# Patient Record
Sex: MALE | Race: BLACK OR AFRICAN AMERICAN | NOT HISPANIC OR LATINO | Employment: UNEMPLOYED | URBAN - METROPOLITAN AREA
[De-identification: names, ages, dates, MRNs, and addresses within clinical notes are randomized per-mention and may not be internally consistent; named-entity substitution may affect disease eponyms.]

---

## 2022-09-25 ENCOUNTER — HOSPITAL ENCOUNTER (EMERGENCY)
Facility: HOSPITAL | Age: 42
Discharge: HOME | End: 2022-09-25
Attending: FAMILY MEDICINE
Payer: MEDICAID

## 2022-09-25 VITALS
OXYGEN SATURATION: 97 % | BODY MASS INDEX: 36.3 KG/M2 | WEIGHT: 268 LBS | RESPIRATION RATE: 18 BRPM | TEMPERATURE: 97 F | SYSTOLIC BLOOD PRESSURE: 145 MMHG | HEIGHT: 72 IN | HEART RATE: 91 BPM | DIASTOLIC BLOOD PRESSURE: 85 MMHG

## 2022-09-25 DIAGNOSIS — N50.812 PAIN IN LEFT TESTICLE: Primary | ICD-10-CM

## 2022-09-25 DIAGNOSIS — N43.3 HYDROCELE, LEFT: ICD-10-CM

## 2022-09-25 LAB
AMPHET UR QL SCN: NEGATIVE
APPEARANCE UR: CLEAR
BARBITURATE SCN PRESENT UR: NEGATIVE
BENZODIAZ UR QL SCN: NEGATIVE
BILIRUB UR QL STRIP.AUTO: NEGATIVE MG/DL
CANNABINOIDS UR QL SCN: POSITIVE
COCAINE UR QL SCN: NEGATIVE
COLOR UR AUTO: ABNORMAL
GLUCOSE UR QL STRIP.AUTO: NEGATIVE MG/DL
KETONES UR QL STRIP.AUTO: NEGATIVE MG/DL
LEUKOCYTE ESTERASE UR QL STRIP.AUTO: NEGATIVE UNIT/L
MDMA UR QL SCN: NEGATIVE
NITRITE UR QL STRIP.AUTO: NEGATIVE
OPIATES UR QL SCN: NEGATIVE
PCP UR QL: NEGATIVE
PH UR STRIP.AUTO: 6 [PH]
PH UR: 6 [PH] (ref 3–11)
PROT UR QL STRIP.AUTO: NEGATIVE MG/DL
RBC UR QL AUTO: NEGATIVE UNIT/L
SP GR UR STRIP.AUTO: <=1.005
SPECIFIC GRAVITY, URINE AUTO (.000) (OHS): <=1.005 (ref 1–1.03)
UROBILINOGEN UR STRIP-ACNC: 2 MG/DL

## 2022-09-25 PROCEDURE — 99284 EMERGENCY DEPT VISIT MOD MDM: CPT | Mod: 25

## 2022-09-25 PROCEDURE — 81003 URINALYSIS AUTO W/O SCOPE: CPT | Mod: 59 | Performed by: FAMILY MEDICINE

## 2022-09-25 PROCEDURE — 80307 DRUG TEST PRSMV CHEM ANLYZR: CPT | Performed by: FAMILY MEDICINE

## 2022-09-25 RX ORDER — TRAMADOL HYDROCHLORIDE 50 MG/1
50 TABLET ORAL EVERY 6 HOURS PRN
Qty: 8 TABLET | Refills: 0 | Status: SHIPPED | OUTPATIENT
Start: 2022-09-25 | End: 2022-09-27

## 2022-09-25 RX ORDER — IBUPROFEN 800 MG/1
800 TABLET ORAL EVERY 8 HOURS PRN
Qty: 15 TABLET | Refills: 0 | Status: SHIPPED | OUTPATIENT
Start: 2022-09-25 | End: 2022-09-30

## 2022-09-26 NOTE — ED PROVIDER NOTES
Encounter Date: 9/25/2022       History     Chief Complaint   Patient presents with    Groin Pain     41-year-old male presents with left testicle pain for the past 2 days.  Patient states pain started after having sex with his wife.  The patient believes his testicle got slight twisted.  Yesterday he was able to untwist the testicle with significant improvement.  Patient denies dysuria or hematuria.  Denies urethral discharge or STD exposure.  States he had a similar episode in the past and was diagnosed with a hydrocele.  No other complaints.    Review of patient's allergies indicates:   Allergen Reactions    Iodine      Past Medical History:   Diagnosis Date    Diabetes mellitus     Hypertension      Past Surgical History:   Procedure Laterality Date    APPENDECTOMY       History reviewed. No pertinent family history.  Social History     Tobacco Use    Smoking status: Every Day     Packs/day: 1.00     Types: Cigarettes    Smokeless tobacco: Never   Substance Use Topics    Alcohol use: Yes     Review of Systems   Constitutional: Negative.    HENT: Negative.     Eyes: Negative.    Respiratory: Negative.     Cardiovascular: Negative.    Gastrointestinal: Negative.    Endocrine: Negative.    Genitourinary:  Positive for testicular pain.   Musculoskeletal: Negative.    Skin: Negative.    Allergic/Immunologic: Negative.    Neurological: Negative.    Hematological: Negative.    Psychiatric/Behavioral: Negative.       Physical Exam     Initial Vitals [09/25/22 2123]   BP Pulse Resp Temp SpO2   (!) 145/85 91 18 97.2 °F (36.2 °C) 97 %      MAP       --         Physical Exam    Nursing note and vitals reviewed.  Constitutional: He appears well-developed and well-nourished.   HENT:   Head: Normocephalic and atraumatic.   Eyes: Conjunctivae and EOM are normal. Pupils are equal, round, and reactive to light.   Neck: Neck supple.   Normal range of motion.  Cardiovascular:  Normal rate and regular rhythm.            Pulmonary/Chest: Breath sounds normal.   Abdominal: Abdomen is soft. He exhibits no distension. There is no abdominal tenderness. There is no rebound.   Genitourinary:    Genitourinary Comments: Testicles descended bilaterally.  No erythema or inflammation.  No significant swelling. No scrotal erythema or induration.  Left testicle is nontender to palpation.  Small hydrocele noted. Hooded penis.  No discharge.     Musculoskeletal:         General: Normal range of motion.      Cervical back: Normal range of motion and neck supple.     Neurological: He is alert and oriented to person, place, and time. He has normal strength. GCS score is 15. GCS eye subscore is 4. GCS verbal subscore is 5. GCS motor subscore is 6.   Skin: Skin is warm. Capillary refill takes less than 2 seconds.   Psychiatric: He has a normal mood and affect.       ED Course   Procedures  Labs Reviewed   URINALYSIS, REFLEX TO URINE CULTURE - Abnormal; Notable for the following components:       Result Value    Color, UA Straw (*)     Urobilinogen, UA 2.0 (*)     All other components within normal limits   DRUG SCREEN, URINE (BEAKER) - Abnormal; Notable for the following components:    Cannabinoids, Urine Positive (*)     All other components within normal limits    Narrative:     Cut off concentrations:    Amphetamines - 1000 ng/ml  Barbiturates - 200 ng/ml  Benzodiazepine - 200 ng/ml  Cannabinoids (THC) - 50 ng/ml  Cocaine - 300 ng/ml  Fentanyl - 1.0 ng/ml  MDMA - 500 ng/ml  Opiates - 300 ng/ml   Phencyclidine (PCP) - 25 ng/ml    Specimen submitted for drug analysis and tested for pH and specific gravity in order to evaluate sample integrity. Suspect tampering if specific gravity is <1.003 and/or pH is not within the range of 4.5 - 8.0  False negatives may result form substances such as bleach added to urine.  False positives may result for the presence of a substance with similar chemical structure to the drug or its metabolite.    This test  provides only a PRELIMINARY analytical test result. A more specific alternate chemical method must be used in order to obtain a confirmed analytical result. Gas chromatography/mass spectrometry (GC/MS) is the preferred confirmatory method. Other chemical confirmation methods are available. Clinical consideration and professional judgement should be applied to any drug of abuse test result, particularly when preliminary positive results are used.    Positive results will be confirmed only at the physicians request. Unconfirmed screening results are to be used only for medical purposes (treatment).               Imaging Results    None          Medications - No data to display  Medical Decision Making:   ED Management:  Patient is nontoxic appearing in no acute distress.  Vital signs stable.  Benign physical exam.  Patient has no testicle pain at this time.  His testicles are descended bilaterally and there is no swelling or inflammation..  No signs of torsion.  UA within normal limits.  No signs of infection or hematuria.  Reviewed previous ultrasound which did show a hydrocele.  Will provide symptomatic treatment and encouraged patient to call follow up with his PCP as soon as possible for further evaluation.  Strict return to ER precautions given, patient voiced understanding.                         Clinical Impression:   Final diagnoses:  [N50.812] Pain in left testicle (Primary)  [N43.3] Hydrocele, left        ED Disposition Condition    Discharge Stable          ED Prescriptions       Medication Sig Dispense Start Date End Date Auth. Provider    traMADoL (ULTRAM) 50 mg tablet Take 1 tablet (50 mg total) by mouth every 6 (six) hours as needed for Pain. 8 tablet 9/25/2022 9/27/2022 Cedric Johnson MD    ibuprofen (ADVIL,MOTRIN) 800 MG tablet Take 1 tablet (800 mg total) by mouth every 8 (eight) hours as needed for Pain. 15 tablet 9/25/2022 9/30/2022 Cedric Johnson MD          Follow-up Information       Follow  up With Specialties Details Why Contact Info    Your PCP  Today               Cedric Johnson MD  09/26/22 8315

## 2022-11-03 ENCOUNTER — OFFICE VISIT (OUTPATIENT)
Dept: INTERNAL MEDICINE | Facility: CLINIC | Age: 42
End: 2022-11-03
Payer: MEDICAID

## 2022-11-03 VITALS
HEART RATE: 86 BPM | TEMPERATURE: 99 F | WEIGHT: 256 LBS | RESPIRATION RATE: 20 BRPM | DIASTOLIC BLOOD PRESSURE: 79 MMHG | SYSTOLIC BLOOD PRESSURE: 128 MMHG | HEIGHT: 72 IN | BODY MASS INDEX: 34.67 KG/M2

## 2022-11-03 DIAGNOSIS — E66.9 OBESITY, UNSPECIFIED CLASSIFICATION, UNSPECIFIED OBESITY TYPE, UNSPECIFIED WHETHER SERIOUS COMORBIDITY PRESENT: ICD-10-CM

## 2022-11-03 DIAGNOSIS — Z00.00 WELLNESS EXAMINATION: Primary | ICD-10-CM

## 2022-11-03 DIAGNOSIS — K57.92 DIVERTICULITIS: ICD-10-CM

## 2022-11-03 DIAGNOSIS — H93.13 TINNITUS OF BOTH EARS: ICD-10-CM

## 2022-11-03 DIAGNOSIS — R07.89 OTHER CHEST PAIN: ICD-10-CM

## 2022-11-03 DIAGNOSIS — N43.3 LEFT HYDROCELE: ICD-10-CM

## 2022-11-03 DIAGNOSIS — F41.9 ANXIETY: ICD-10-CM

## 2022-11-03 DIAGNOSIS — I10 HYPERTENSION, UNSPECIFIED TYPE: ICD-10-CM

## 2022-11-03 DIAGNOSIS — Z11.9 SCREENING EXAMINATION FOR INFECTIOUS DISEASE: ICD-10-CM

## 2022-11-03 DIAGNOSIS — M51.36 LUMBAR DEGENERATIVE DISC DISEASE: ICD-10-CM

## 2022-11-03 DIAGNOSIS — F17.219 CIGARETTE NICOTINE DEPENDENCE WITH NICOTINE-INDUCED DISORDER: ICD-10-CM

## 2022-11-03 DIAGNOSIS — Z87.19 HISTORY OF BLOODY STOOLS: ICD-10-CM

## 2022-11-03 PROBLEM — M51.369 LUMBAR DEGENERATIVE DISC DISEASE: Status: ACTIVE | Noted: 2022-11-03

## 2022-11-03 PROBLEM — Z72.0 TOBACCO USER: Status: RESOLVED | Noted: 2022-11-03 | Resolved: 2022-11-03

## 2022-11-03 PROBLEM — Z72.0 TOBACCO USER: Status: ACTIVE | Noted: 2022-11-03

## 2022-11-03 PROCEDURE — 4010F PR ACE/ARB THEARPY RXD/TAKEN: ICD-10-PCS | Mod: CPTII,,, | Performed by: NURSE PRACTITIONER

## 2022-11-03 PROCEDURE — 99406 PR TOBACCO USE CESSATION INTERMEDIATE 3-10 MINUTES: ICD-10-PCS | Mod: S$PBB,,, | Performed by: NURSE PRACTITIONER

## 2022-11-03 PROCEDURE — 3074F SYST BP LT 130 MM HG: CPT | Mod: CPTII,,, | Performed by: NURSE PRACTITIONER

## 2022-11-03 PROCEDURE — 1160F PR REVIEW ALL MEDS BY PRESCRIBER/CLIN PHARMACIST DOCUMENTED: ICD-10-PCS | Mod: CPTII,,, | Performed by: NURSE PRACTITIONER

## 2022-11-03 PROCEDURE — 4010F ACE/ARB THERAPY RXD/TAKEN: CPT | Mod: CPTII,,, | Performed by: NURSE PRACTITIONER

## 2022-11-03 PROCEDURE — 99215 OFFICE O/P EST HI 40 MIN: CPT | Mod: PBBFAC | Performed by: NURSE PRACTITIONER

## 2022-11-03 PROCEDURE — 99212 OFFICE O/P EST SF 10 MIN: CPT | Mod: 25,S$PBB,, | Performed by: NURSE PRACTITIONER

## 2022-11-03 PROCEDURE — 1159F MED LIST DOCD IN RCRD: CPT | Mod: CPTII,,, | Performed by: NURSE PRACTITIONER

## 2022-11-03 PROCEDURE — 3074F PR MOST RECENT SYSTOLIC BLOOD PRESSURE < 130 MM HG: ICD-10-PCS | Mod: CPTII,,, | Performed by: NURSE PRACTITIONER

## 2022-11-03 PROCEDURE — 99406 BEHAV CHNG SMOKING 3-10 MIN: CPT | Mod: S$PBB,,, | Performed by: NURSE PRACTITIONER

## 2022-11-03 PROCEDURE — 3078F DIAST BP <80 MM HG: CPT | Mod: CPTII,,, | Performed by: NURSE PRACTITIONER

## 2022-11-03 PROCEDURE — 99212 PR OFFICE/OUTPT VISIT, EST, LEVL II, 10-19 MIN: ICD-10-PCS | Mod: 25,S$PBB,, | Performed by: NURSE PRACTITIONER

## 2022-11-03 PROCEDURE — 1159F PR MEDICATION LIST DOCUMENTED IN MEDICAL RECORD: ICD-10-PCS | Mod: CPTII,,, | Performed by: NURSE PRACTITIONER

## 2022-11-03 PROCEDURE — 1160F RVW MEDS BY RX/DR IN RCRD: CPT | Mod: CPTII,,, | Performed by: NURSE PRACTITIONER

## 2022-11-03 PROCEDURE — 3008F PR BODY MASS INDEX (BMI) DOCUMENTED: ICD-10-PCS | Mod: CPTII,,, | Performed by: NURSE PRACTITIONER

## 2022-11-03 PROCEDURE — 3008F BODY MASS INDEX DOCD: CPT | Mod: CPTII,,, | Performed by: NURSE PRACTITIONER

## 2022-11-03 PROCEDURE — 3078F PR MOST RECENT DIASTOLIC BLOOD PRESSURE < 80 MM HG: ICD-10-PCS | Mod: CPTII,,, | Performed by: NURSE PRACTITIONER

## 2022-11-03 RX ORDER — BUSPIRONE HYDROCHLORIDE 5 MG/1
5 TABLET ORAL 2 TIMES DAILY
COMMUNITY
Start: 2022-05-31 | End: 2022-11-03

## 2022-11-03 RX ORDER — DILTIAZEM HYDROCHLORIDE 180 MG/1
180 CAPSULE, COATED, EXTENDED RELEASE ORAL NIGHTLY
COMMUNITY
Start: 2022-10-21

## 2022-11-03 RX ORDER — LOSARTAN POTASSIUM 25 MG/1
25 TABLET ORAL DAILY
COMMUNITY
Start: 2022-06-08 | End: 2022-11-03

## 2022-11-03 RX ORDER — ESCITALOPRAM OXALATE 10 MG/1
10 TABLET ORAL DAILY
Qty: 30 TABLET | Refills: 0 | Status: SHIPPED | OUTPATIENT
Start: 2022-11-03 | End: 2022-11-17

## 2022-11-03 RX ORDER — ONDANSETRON 4 MG/1
4 TABLET, ORALLY DISINTEGRATING ORAL EVERY 8 HOURS PRN
COMMUNITY
Start: 2022-10-31 | End: 2022-12-15

## 2022-11-03 RX ORDER — IBUPROFEN 200 MG
TABLET ORAL
COMMUNITY
Start: 2022-02-27 | End: 2022-11-03

## 2022-11-03 RX ORDER — CLONIDINE HYDROCHLORIDE 0.1 MG/1
0.1 TABLET ORAL 3 TIMES DAILY PRN
COMMUNITY
Start: 2022-06-20 | End: 2022-11-03

## 2022-11-03 RX ORDER — OXYCODONE AND ACETAMINOPHEN 5; 325 MG/1; MG/1
1 TABLET ORAL EVERY 4 HOURS PRN
COMMUNITY
Start: 2022-10-31 | End: 2022-12-15

## 2022-11-03 RX ORDER — DICYCLOMINE HYDROCHLORIDE 20 MG/1
TABLET ORAL
COMMUNITY
Start: 2022-10-31 | End: 2022-12-15

## 2022-11-03 RX ORDER — AMOXICILLIN AND CLAVULANATE POTASSIUM 875; 125 MG/1; MG/1
TABLET, FILM COATED ORAL
COMMUNITY
Start: 2022-10-31 | End: 2022-12-15

## 2022-11-03 RX ORDER — ASPIRIN 81 MG/1
81 TABLET ORAL DAILY
COMMUNITY
End: 2023-11-13 | Stop reason: SDUPTHER

## 2022-11-03 NOTE — ASSESSMENT & PLAN NOTE
CT lumbar spine w/o contrast July 29, 2016 (see outside report) with advanced DDD at L5-S1, mild levoscoliosis, minimal retrolisthesis of L5 on S1, b/l posterior lateral disc osteophyte complex formation producing foraminal narrowing, disc protrusion noted   Patient had no complaints of back pain today. Will continue to monitor.

## 2022-11-03 NOTE — ASSESSMENT & PLAN NOTE
ER visit [facility] 10/31/22 with CT abdomen/pelvis w/ contrast with findings of acute diverticulitis noted of proximal segment of sigmoid colon  Patient discharged with Bentyl, Percocet and Augmentin.   Advised on completion of medications as prescribed and use Percocet prn  Avoid NSAIDs/alcohol  Any worsening or persistent symptoms patient advised may report to ER

## 2022-11-03 NOTE — ASSESSMENT & PLAN NOTE
0.5 ppd  Discussed for at least 3 minutes importance of smoking cessation and health benefits, including adverse effects to patient's health and organs.  Encouraged cessation.  He wants to quit. Referred for smoking cessation.

## 2022-11-03 NOTE — ASSESSMENT & PLAN NOTE
Reports h/o bloody stools about 3 months ago  He is interested in colonoscopy. Referral to GI for further evaluation

## 2022-11-03 NOTE — ASSESSMENT & PLAN NOTE
[facility] ER visit 10/31/22 with left hydrocele and continued left groin pain  Referral to Urology at [facility]

## 2022-11-03 NOTE — ASSESSMENT & PLAN NOTE
He had cardiac work up/ stress test completed with cardiologist last year. Will obtain copy of records for review. Asymptomatic at this time.

## 2022-11-03 NOTE — ASSESSMENT & PLAN NOTE
BMI 34.72  Educated on increased risk of disease s/t obesity.  Educated on health benefits of at least 5 days/ week of 30 minutes moderate intensity exercise (brisk walking) and 2 or more days/ week of muscle strength activities (as tolerated).  Eat well balanced diet of fresh fruits/ vegetables, whole grains, lean meats and limit high carbohydrate foods.

## 2022-11-03 NOTE — ASSESSMENT & PLAN NOTE
/79  Follow low sodium diet, < 2 gm/day (avoid high salty foods such as processed meats/ sausage/galarza/ sandwich meat, chips, pickles, cheese, crackers and soft drinks/ electrolyte replacement drinks).  Avoid tobacco/ alcohol use  Educated on health benefits of at least 5 days/ week of 30 minutes moderate intensity exercise (brisk walking) and 2 or more days/ week of muscle strength activities  Daily ASA 81 mg for CV prevention  Continue current medication regimen

## 2022-11-08 ENCOUNTER — LAB VISIT (OUTPATIENT)
Dept: LAB | Facility: HOSPITAL | Age: 42
End: 2022-11-08
Attending: NURSE PRACTITIONER
Payer: MEDICAID

## 2022-11-08 DIAGNOSIS — F41.9 ANXIETY: ICD-10-CM

## 2022-11-08 DIAGNOSIS — Z11.9 SCREENING EXAMINATION FOR INFECTIOUS DISEASE: ICD-10-CM

## 2022-11-08 DIAGNOSIS — I10 HYPERTENSION, UNSPECIFIED TYPE: ICD-10-CM

## 2022-11-08 DIAGNOSIS — Z00.00 WELLNESS EXAMINATION: ICD-10-CM

## 2022-11-08 LAB
ALBUMIN SERPL-MCNC: 4.1 GM/DL (ref 3.5–5)
ALBUMIN/GLOB SERPL: 1.3 RATIO (ref 1.1–2)
ALP SERPL-CCNC: 58 UNIT/L (ref 40–150)
ALT SERPL-CCNC: 23 UNIT/L (ref 0–55)
AST SERPL-CCNC: 16 UNIT/L (ref 5–34)
BASOPHILS # BLD AUTO: 0.04 X10(3)/MCL (ref 0–0.2)
BASOPHILS NFR BLD AUTO: 0.5 %
BILIRUBIN DIRECT+TOT PNL SERPL-MCNC: 0.8 MG/DL
BUN SERPL-MCNC: 10.5 MG/DL (ref 8.9–20.6)
CALCIUM SERPL-MCNC: 10.2 MG/DL (ref 8.4–10.2)
CHLORIDE SERPL-SCNC: 98 MMOL/L (ref 98–107)
CHOLEST SERPL-MCNC: 152 MG/DL
CHOLEST/HDLC SERPL: 3 {RATIO} (ref 0–5)
CO2 SERPL-SCNC: 26 MMOL/L (ref 22–29)
CREAT SERPL-MCNC: 1.2 MG/DL (ref 0.73–1.18)
EOSINOPHIL # BLD AUTO: 0.07 X10(3)/MCL (ref 0–0.9)
EOSINOPHIL NFR BLD AUTO: 0.8 %
ERYTHROCYTE [DISTWIDTH] IN BLOOD BY AUTOMATED COUNT: 13.2 % (ref 11.5–17)
EST. AVERAGE GLUCOSE BLD GHB EST-MCNC: 116.9 MG/DL
GFR SERPLBLD CREATININE-BSD FMLA CKD-EPI: >60 MLS/MIN/1.73/M2
GLOBULIN SER-MCNC: 3.2 GM/DL (ref 2.4–3.5)
GLUCOSE SERPL-MCNC: 109 MG/DL (ref 74–100)
HAV IGM SERPL QL IA: NONREACTIVE
HBA1C MFR BLD: 5.7 %
HBV CORE IGM SERPL QL IA: NONREACTIVE
HBV SURFACE AG SERPL QL IA: NONREACTIVE
HCT VFR BLD AUTO: 46.1 % (ref 42–52)
HCV AB SERPL QL IA: NONREACTIVE
HDLC SERPL-MCNC: 48 MG/DL (ref 35–60)
HGB BLD-MCNC: 15.4 GM/DL (ref 14–18)
HIV 1+2 AB+HIV1 P24 AG SERPL QL IA: NONREACTIVE
IMM GRANULOCYTES # BLD AUTO: 0.04 X10(3)/MCL (ref 0–0.04)
IMM GRANULOCYTES NFR BLD AUTO: 0.5 %
LDLC SERPL CALC-MCNC: 92 MG/DL (ref 50–140)
LYMPHOCYTES # BLD AUTO: 2.56 X10(3)/MCL (ref 0.6–4.6)
LYMPHOCYTES NFR BLD AUTO: 29 %
MCH RBC QN AUTO: 28.6 PG (ref 27–31)
MCHC RBC AUTO-ENTMCNC: 33.4 MG/DL (ref 33–36)
MCV RBC AUTO: 85.7 FL (ref 80–94)
MONOCYTES # BLD AUTO: 0.91 X10(3)/MCL (ref 0.1–1.3)
MONOCYTES NFR BLD AUTO: 10.3 %
NEUTROPHILS # BLD AUTO: 5.2 X10(3)/MCL (ref 2.1–9.2)
NEUTROPHILS NFR BLD AUTO: 58.9 %
NRBC BLD AUTO-RTO: 0 %
PLATELET # BLD AUTO: 309 X10(3)/MCL (ref 130–400)
PMV BLD AUTO: 10.6 FL (ref 7.4–10.4)
POTASSIUM SERPL-SCNC: 4.4 MMOL/L (ref 3.5–5.1)
PROT SERPL-MCNC: 7.3 GM/DL (ref 6.4–8.3)
RBC # BLD AUTO: 5.38 X10(6)/MCL (ref 4.7–6.1)
SODIUM SERPL-SCNC: 137 MMOL/L (ref 136–145)
T PALLIDUM AB SER QL: NONREACTIVE
TRIGL SERPL-MCNC: 60 MG/DL (ref 34–140)
TSH SERPL-ACNC: 0.74 UIU/ML (ref 0.35–4.94)
VLDLC SERPL CALC-MCNC: 12 MG/DL
WBC # SPEC AUTO: 8.8 X10(3)/MCL (ref 4.5–11.5)

## 2022-11-08 PROCEDURE — 83036 HEMOGLOBIN GLYCOSYLATED A1C: CPT

## 2022-11-08 PROCEDURE — 84443 ASSAY THYROID STIM HORMONE: CPT

## 2022-11-08 PROCEDURE — 85025 COMPLETE CBC W/AUTO DIFF WBC: CPT

## 2022-11-08 PROCEDURE — 36415 COLL VENOUS BLD VENIPUNCTURE: CPT

## 2022-11-08 PROCEDURE — 87389 HIV-1 AG W/HIV-1&-2 AB AG IA: CPT

## 2022-11-08 PROCEDURE — 86780 TREPONEMA PALLIDUM: CPT

## 2022-11-08 PROCEDURE — 80061 LIPID PANEL: CPT

## 2022-11-08 PROCEDURE — 80053 COMPREHEN METABOLIC PANEL: CPT

## 2022-11-08 PROCEDURE — 80074 ACUTE HEPATITIS PANEL: CPT

## 2022-11-09 LAB — PATH REV: NORMAL

## 2022-11-17 ENCOUNTER — OFFICE VISIT (OUTPATIENT)
Dept: INTERNAL MEDICINE | Facility: CLINIC | Age: 42
End: 2022-11-17
Payer: MEDICAID

## 2022-11-17 DIAGNOSIS — F41.9 ANXIETY: Primary | ICD-10-CM

## 2022-11-17 DIAGNOSIS — R43.8 HYPOSMIA: ICD-10-CM

## 2022-11-17 DIAGNOSIS — R73.02 IGT (IMPAIRED GLUCOSE TOLERANCE): ICD-10-CM

## 2022-11-17 PROBLEM — F32.A ANXIETY AND DEPRESSION: Status: ACTIVE | Noted: 2022-11-03

## 2022-11-17 PROBLEM — R43.1 PAROSMIA: Status: ACTIVE | Noted: 2022-11-17

## 2022-11-17 PROCEDURE — 4010F ACE/ARB THERAPY RXD/TAKEN: CPT | Mod: CPTII,95,, | Performed by: NURSE PRACTITIONER

## 2022-11-17 PROCEDURE — 99214 PR OFFICE/OUTPT VISIT, EST, LEVL IV, 30-39 MIN: ICD-10-PCS | Mod: 95,,, | Performed by: NURSE PRACTITIONER

## 2022-11-17 PROCEDURE — 1159F PR MEDICATION LIST DOCUMENTED IN MEDICAL RECORD: ICD-10-PCS | Mod: CPTII,95,, | Performed by: NURSE PRACTITIONER

## 2022-11-17 PROCEDURE — 1160F PR REVIEW ALL MEDS BY PRESCRIBER/CLIN PHARMACIST DOCUMENTED: ICD-10-PCS | Mod: CPTII,95,, | Performed by: NURSE PRACTITIONER

## 2022-11-17 PROCEDURE — 4010F PR ACE/ARB THEARPY RXD/TAKEN: ICD-10-PCS | Mod: CPTII,95,, | Performed by: NURSE PRACTITIONER

## 2022-11-17 PROCEDURE — 1159F MED LIST DOCD IN RCRD: CPT | Mod: CPTII,95,, | Performed by: NURSE PRACTITIONER

## 2022-11-17 PROCEDURE — 1160F RVW MEDS BY RX/DR IN RCRD: CPT | Mod: CPTII,95,, | Performed by: NURSE PRACTITIONER

## 2022-11-17 PROCEDURE — 99214 OFFICE O/P EST MOD 30 MIN: CPT | Mod: 95,,, | Performed by: NURSE PRACTITIONER

## 2022-11-17 NOTE — ASSESSMENT & PLAN NOTE
He reports having COVID19 x 2 and loss of smell since. He is worried about this and concerned about the cause. He is interested in seeking further evaluation.   Referral to ENT

## 2022-11-17 NOTE — PROGRESS NOTES
"Established Patient - Audio Only Telehealth Visit     The patient location is: home  The chief complaint leading to consultation is: lab results, anxiety / depression  Visit type: Virtual visit with audio only (telephone)  Total time spent with patient: 13 minutes       The reason for the audio only service rather than synchronous audio and video virtual visit was related to technical difficulties or patient preference/necessity.     Each patient to whom I provide medical services by telemedicine is:  (1) informed of the relationship between the physician and patient and the respective role of any other health care provider with respect to management of the patient; and (2) notified that they may decline to receive medical services by telemedicine and may withdraw from such care at any time. Patient verbally consented to receive this service via voice-only telephone call.       HPI: Initial visit November 3, 2022: 43 yo AAM to establish PCP care. PMH includes HTN, CP, anxiety, obesity, tobacco abuse. His primary concern is left groin and left lower abdominal pain ongoing intermittently for the last month. Appetite is poor; he states he is eating salad or fruit once a day. He denies any n/v/d, fever, chills. Last BM x 2 days ago. He was seen at [facility] on 10/31/22 for same with CT abd/pelvis with diagnosis of diverticulitis. He is currently taking antibiotics as prescribed. He states he is feeling better but still feels like "something is pulling" as he points to left lower abd/groin. H/o intermittent CP that he describes as sharp, sticking pains. He states he had cardiac work up for this and was told his results were normal.   Endorses tinnitus to bilateral ears. He states it is getting worse and happening a lot lately. 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 He smokes 1 ppd but ready to quit and would be interested in patches. He does have history of anxiety for which " he previously took Buspar. He stopped medication because it increased anxiety and irritability for him. He is interested in other medications to help with this. Refuses vaccines. Denies prior colonoscopy or family history of CRC/ polyps. He admits to h/o bloody stools with last episode approximately 3 months ago and would like to establish with Gastroenterologist. Otherwise, denies any other concerns/ complaints.     Today, 2022: 43 yo AAM for 2 week f/u and lab results. He states he stopped Lexparo as it caused his head to feel heavy. He states he took for about a week and then stopped. He states it made him feel worse. Made him in a deeper depression than what he already is. Does admit to depression in addition to anxiety. Reports history of behavior problems as a child but has been able to control in adulthood. He denies SI/HI. He is worried about his lab results and wants to discuss his high readings. All reviewed and discussed in detail. He states he is really worried about his health. He is worried why he still cannot smell since having COVID19 and would like further evaluation to know why or how long this may last. Otherwise, no other concerns.     Other providers   Cardiologist- name ?   Prior PCP- NP at clinic in [place]- name ?   Patient will sign KELLIE to obtain copies of records with name of prior providers     Medication List with Changes/Refills   Current Medications    AMOXICILLIN-CLAVULANATE 875-125MG (AUGMENTIN) 875-125 MG PER TABLET    SMARTSI Tablet(s) By Mouth Morning-Night    ASPIRIN (ECOTRIN) 81 MG EC TABLET    Take 81 mg by mouth once daily.    DICYCLOMINE (BENTYL) 20 MG TABLET    SMARTSI Tablet(s) By Mouth Morning-Night    DILTIAZEM (CARDIZEM CD) 180 MG 24 HR CAPSULE    Take 180 mg by mouth every evening.    ONDANSETRON (ZOFRAN-ODT) 4 MG TBDL    Take 4 mg by mouth every 8 (eight) hours as needed.    OXYCODONE-ACETAMINOPHEN (PERCOCET) 5-325 MG PER TABLET    Take 1 tablet by  mouth every 4 (four) hours as needed.   Discontinued Medications    ESCITALOPRAM OXALATE (LEXAPRO) 10 MG TABLET    Take 1 tablet (10 mg total) by mouth once daily.             Assessment and plan:    Problem List Items Addressed This Visit          Neuro    Hyposmia    Current Assessment & Plan     He reports having COVID19 x 2 and loss of smell since. He is worried about this and concerned about the cause. He is interested in seeking further evaluation.   Referral to ENT         Relevant Orders    Ambulatory referral/consult to ENT       Psychiatric    Anxiety and depression - Primary    Current Assessment & Plan      He states he was previously on Buspar but caused him increased anxiety and irritability so he stopped it. He denies any other treatment for anxiety. He states he is willing to try another medication. He mentions this is why he smokes marijuana to help him calm down. He states anxiety triggered from life stressors.   Stopped Lexapro due to side effect  Endorses depression also  Begin sertraline 50 mg once daily  Referral to psychiatry  Advised if any worsening depression /anxiety, SI/HI- call 911 and report to nearest ER                      Endocrine    IGT (impaired glucose tolerance)    Current Assessment & Plan     A1c 5.7%  ADA diet, regular exercise encouraged          Follow up 4 weeks virtual audio for anxiety/ depression.                        This service was not originating from a related E/M service provided within the previous 7 days nor will  to an E/M service or procedure within the next 24 hours or my soonest available appointment.  Prevailing standard of care was able to be met in this audio-only visit.

## 2022-11-17 NOTE — ASSESSMENT & PLAN NOTE
He states he was previously on Buspar but caused him increased anxiety and irritability so he stopped it. He denies any other treatment for anxiety. He states he is willing to try another medication. He mentions this is why he smokes marijuana to help him calm down. He states anxiety triggered from life stressors.   Stopped Lexapro due to side effect  Endorses depression also  Begin sertraline 50 mg once daily  Referral to psychiatry  Advised if any worsening depression /anxiety, SI/HI- call 911 and report to nearest ER

## 2022-11-23 ENCOUNTER — CLINICAL SUPPORT (OUTPATIENT)
Dept: AUDIOLOGY | Facility: HOSPITAL | Age: 42
End: 2022-11-23
Payer: MEDICAID

## 2022-11-23 DIAGNOSIS — H93.13 TINNITUS OF BOTH EARS: ICD-10-CM

## 2022-11-23 DIAGNOSIS — H90.3 SENSORINEURAL HEARING LOSS (SNHL) OF BOTH EARS: Primary | ICD-10-CM

## 2022-11-23 PROCEDURE — 92567 TYMPANOMETRY: CPT | Performed by: AUDIOLOGIST

## 2022-11-23 PROCEDURE — 92557 COMPREHENSIVE HEARING TEST: CPT | Performed by: AUDIOLOGIST

## 2022-11-23 NOTE — PROGRESS NOTES
Adult Hearing Evaluation    Patient History:Domingo Matias is a 42 y.o. male referred by Galina Paulson NP for hearing evaluation due to bilateral tinnitus. Patient endorses hearing difficulty beginning in his 30's. His main complaint is bilateral tinnitus (right>left). He describes tinnitus as ringing and intermittent. It is not impacting his sleep/ does not report it as bothersome. He has history of occupational noise exposure, sometimes in the absence of noise protection (). He denies dizziness, otalgia, otorrhea, history of OM/otologic surgery.  He does experience communication difficulty in everyday conversation and reports using visual cues to aid in communication.     Interpretation of Results:  Otoscopy revealed clear canal and normal TM, bilaterally.   Tympanometry revealed normal (Type A) TM mobility, bilaterally.   DPOAEs were absent 2k-5k Hz indicating abnormal cochlear outer hair cell function, bilaterally.   Pure tone audiometry revealed normal sloping to moderately-severe SNHL in the right ear and normal sloping to severe SNHL in the left ear.   Speech recognition thresholds are in agreement with pure tone data (25 dB HL in right ear; 30 dB HL in left ear).   Excellent word recognition, bilaterally (96% in right ear; 92% in left ear).     Impressions:   Bilateral sensorineural hearing loss. Hearing is primarily symmetric with the exception of a 40 dB asymmetry at 3k Hz.     Recommendations:   1. ENT evaluation.   2. Annual hearing evaluation.   3. Patient would likely benefit from hearing aids, but does not quality for [place] [employer] and does not have coverage through insurance. He would need to seek devices at private practice and he is not interested in purchasing at this time.     Josiah Vogt AuD CCC-A

## 2022-11-30 ENCOUNTER — OFFICE VISIT (OUTPATIENT)
Dept: UROLOGY | Facility: CLINIC | Age: 42
End: 2022-11-30
Payer: MEDICAID

## 2022-11-30 VITALS
HEART RATE: 85 BPM | BODY MASS INDEX: 33.05 KG/M2 | OXYGEN SATURATION: 97 % | DIASTOLIC BLOOD PRESSURE: 88 MMHG | RESPIRATION RATE: 20 BRPM | SYSTOLIC BLOOD PRESSURE: 135 MMHG | TEMPERATURE: 98 F | HEIGHT: 72 IN | WEIGHT: 244 LBS

## 2022-11-30 DIAGNOSIS — N50.82 SCROTAL PAIN: ICD-10-CM

## 2022-11-30 DIAGNOSIS — K57.92 DIVERTICULITIS: Primary | ICD-10-CM

## 2022-11-30 PROCEDURE — 3079F DIAST BP 80-89 MM HG: CPT | Mod: CPTII,,, | Performed by: UROLOGY

## 2022-11-30 PROCEDURE — 3008F PR BODY MASS INDEX (BMI) DOCUMENTED: ICD-10-PCS | Mod: CPTII,,, | Performed by: UROLOGY

## 2022-11-30 PROCEDURE — 4010F ACE/ARB THERAPY RXD/TAKEN: CPT | Mod: CPTII,,, | Performed by: UROLOGY

## 2022-11-30 PROCEDURE — 1160F PR REVIEW ALL MEDS BY PRESCRIBER/CLIN PHARMACIST DOCUMENTED: ICD-10-PCS | Mod: CPTII,,, | Performed by: UROLOGY

## 2022-11-30 PROCEDURE — 99204 PR OFFICE/OUTPT VISIT, NEW, LEVL IV, 45-59 MIN: ICD-10-PCS | Mod: S$PBB,,, | Performed by: UROLOGY

## 2022-11-30 PROCEDURE — 3075F PR MOST RECENT SYSTOLIC BLOOD PRESS GE 130-139MM HG: ICD-10-PCS | Mod: CPTII,,, | Performed by: UROLOGY

## 2022-11-30 PROCEDURE — 3075F SYST BP GE 130 - 139MM HG: CPT | Mod: CPTII,,, | Performed by: UROLOGY

## 2022-11-30 PROCEDURE — 3079F PR MOST RECENT DIASTOLIC BLOOD PRESSURE 80-89 MM HG: ICD-10-PCS | Mod: CPTII,,, | Performed by: UROLOGY

## 2022-11-30 PROCEDURE — 99215 OFFICE O/P EST HI 40 MIN: CPT | Mod: PBBFAC | Performed by: UROLOGY

## 2022-11-30 PROCEDURE — 99204 OFFICE O/P NEW MOD 45 MIN: CPT | Mod: S$PBB,,, | Performed by: UROLOGY

## 2022-11-30 PROCEDURE — 1159F MED LIST DOCD IN RCRD: CPT | Mod: CPTII,,, | Performed by: UROLOGY

## 2022-11-30 PROCEDURE — 1159F PR MEDICATION LIST DOCUMENTED IN MEDICAL RECORD: ICD-10-PCS | Mod: CPTII,,, | Performed by: UROLOGY

## 2022-11-30 PROCEDURE — 3008F BODY MASS INDEX DOCD: CPT | Mod: CPTII,,, | Performed by: UROLOGY

## 2022-11-30 PROCEDURE — 1160F RVW MEDS BY RX/DR IN RCRD: CPT | Mod: CPTII,,, | Performed by: UROLOGY

## 2022-11-30 PROCEDURE — 4010F PR ACE/ARB THEARPY RXD/TAKEN: ICD-10-PCS | Mod: CPTII,,, | Performed by: UROLOGY

## 2022-11-30 NOTE — PROGRESS NOTES
CC:  Scrotal pain    HPI:  Domingo Matias is a 42 y.o. male being seen in consultation for scrotal pain.  The patient began on 25 September 2022 with left scrotal pain.  This started after sexual activity with his wife.  He said he felt like the testicle had turned and he manually turned back.  The emergency room doctor felt like he may have a hydrocele but no scrotal ultrasound was obtained.  He then returned to the emergency room on 30 October 2022 with abdominal pain complaints and was found to have diverticulitis.  He does continue to have scrotal pain which he describes as a pulling sensation which occurs with bowel movement and sometimes with urination.  This is mostly left-sided.  This is still present to a mild degree an intermittent and isn't much improved from the initial onset.  He does have an appointment for follow-up with GI.    Lab Results:  Recent Labs     11/08/22  0706   CREATININE 1.20*       Imaging:  CT - 30 October 2022:     Acute diverticulitis noted in the proximal segment of the sigmoid colon with moderate surrounding inflammatory change.      Data Review:  Note from ER on 25 September 2022 from Cedric Johnson MD and ER note from 30 October 2022 from Aidan Dave MD.  CT scan.  Labs from ER visits.     ROS:  All systems reviewed and are negative except as documented in HPI and/or Assessment and Plan.     Patient Active Problem List:     Patient Active Problem List   Diagnosis    Hypertension    Obesity    Anxiety and depression    Lumbar degenerative disc disease    Left hydrocele    Diverticulitis    Cigarette nicotine dependence with nicotine-induced disorder    Tinnitus of both ears    Other chest pain    History of bloody stools    IGT (impaired glucose tolerance)    Parosmia    Hyposmia        Past Medical History:  Past Medical History:   Diagnosis Date    Diabetes mellitus     Hypertension         Past Surgical History:  Past Surgical History:   Procedure Laterality Date    APPENDECTOMY           Family History:  Family History   Problem Relation Age of Onset    Heart failure Mother     Kidney failure Mother         Social History:  Social History     Socioeconomic History    Marital status:    Tobacco Use    Smoking status: Every Day     Packs/day: 1.00     Years: 30.00     Pack years: 30.00     Types: Cigarettes    Smokeless tobacco: Former     Types: Chew   Substance and Sexual Activity    Alcohol use: Yes     Comment: occasionally    Drug use: Yes     Types: Marijuana     Social Determinants of Health     Physical Activity: Inactive    Days of Exercise per Week: 0 days    Minutes of Exercise per Session: 0 min        Allergies:  Review of patient's allergies indicates:   Allergen Reactions    Iodine Nausea And Vomiting        Objective:  Vitals:    11/30/22 0741   BP: 135/88   Pulse: 85   Resp: 20   Temp: 97.9 °F (36.6 °C)     General:  Well developed, well nourished adult male in no acute distress  Abdomen: Soft, nontender, no masses  Extremities:  No clubbing, cyanosis, or edema  Neurologic:  Grossly intact  Musculoskeletal:  Normal tone  Penis:  Circumcised, no lesions  Scrotum:  No hydrocele  Testicles:  Nontender, no masses  Epididymis:  Normal without masses  Prostate exam:  Nontender, symmetrical, no nodules  Rectal:  Normal rectal tone      Assessment:  1. Scrotal pain  - Ambulatory referral/consult to Urology  - US Scrotum And Testicles; Future    2. Diverticulitis     Plan:  I will get a scrotal ultrasound to rule out any scrotal pathology especially since his scrotal pain started before his diagnosis of diverticulitis.  However, I do feel that most of his scrotal complaints are from an inflammatory reaction around the spermatic cord due to the diverticulitis.  This has been treated and he is following up with GI.    Follow-up:  After scrotal ultrasound.

## 2022-12-01 ENCOUNTER — HOSPITAL ENCOUNTER (OUTPATIENT)
Dept: RADIOLOGY | Facility: HOSPITAL | Age: 42
Discharge: HOME | End: 2022-12-01
Attending: UROLOGY
Payer: MEDICAID

## 2022-12-01 DIAGNOSIS — N50.82 SCROTAL PAIN: ICD-10-CM

## 2022-12-01 PROCEDURE — 76870 US EXAM SCROTUM: CPT | Mod: TC

## 2022-12-07 ENCOUNTER — CLINICAL SUPPORT (OUTPATIENT)
Dept: SMOKING CESSATION | Facility: CLINIC | Age: 42
End: 2022-12-07
Payer: COMMERCIAL

## 2022-12-07 DIAGNOSIS — F17.200 NICOTINE DEPENDENCE: Primary | ICD-10-CM

## 2022-12-07 PROCEDURE — 99404 PR PREVENT COUNSEL,INDIV,60 MIN: ICD-10-PCS | Mod: S$GLB,,, | Performed by: INTERNAL MEDICINE

## 2022-12-07 PROCEDURE — 99404 PREV MED CNSL INDIV APPRX 60: CPT | Mod: S$GLB,,, | Performed by: INTERNAL MEDICINE

## 2022-12-07 RX ORDER — IBUPROFEN 200 MG
1 TABLET ORAL DAILY
Qty: 28 PATCH | Refills: 0 | Status: SHIPPED | OUTPATIENT
Start: 2022-12-07 | End: 2023-04-10

## 2022-12-07 NOTE — PROGRESS NOTES
Patient will be participating in biweekly tobacco cessation meetings and will begin the prescribed tobacco cessation medication regimen of 21 mg nicotine patch QD.  Patient currently smokes 30 cigarettes per day and occasionally vapes.  Discussed the harmful effects of vaping.  Discussed the role of tobacco cessation program, role of nicotine replacement therapy (NRT) and behavioral changes to assist the patient to reach his goal of being tobacco free. Education and instruction on the role of the NRT, usage and proper placement of the patch. Patient verbalized understanding and willingness to use the patch.  Pt started on rate reduction and wait time of 15 min prior to smoking. Pt's exhaled carbon monoxide level was 17 ppm as per [employer]. (non- smoker = 0-5 ppm.)

## 2022-12-15 ENCOUNTER — OFFICE VISIT (OUTPATIENT)
Dept: OTOLARYNGOLOGY | Facility: CLINIC | Age: 42
End: 2022-12-15
Payer: MEDICAID

## 2022-12-15 ENCOUNTER — OFFICE VISIT (OUTPATIENT)
Dept: INTERNAL MEDICINE | Facility: CLINIC | Age: 42
End: 2022-12-15
Payer: MEDICAID

## 2022-12-15 VITALS — TEMPERATURE: 98 F | HEART RATE: 86 BPM | DIASTOLIC BLOOD PRESSURE: 81 MMHG | SYSTOLIC BLOOD PRESSURE: 158 MMHG

## 2022-12-15 DIAGNOSIS — F32.A ANXIETY AND DEPRESSION: Primary | ICD-10-CM

## 2022-12-15 DIAGNOSIS — R43.0 ANOSMIA: ICD-10-CM

## 2022-12-15 DIAGNOSIS — H90.3 SENSORINEURAL HEARING LOSS (SNHL) OF BOTH EARS: Primary | ICD-10-CM

## 2022-12-15 DIAGNOSIS — H83.3X3 NOISE-INDUCED HEARING LOSS OF BOTH EARS: ICD-10-CM

## 2022-12-15 DIAGNOSIS — F41.9 ANXIETY AND DEPRESSION: Primary | ICD-10-CM

## 2022-12-15 DIAGNOSIS — I86.1 BILATERAL VARICOCELES: ICD-10-CM

## 2022-12-15 DIAGNOSIS — F17.200 SMOKER: ICD-10-CM

## 2022-12-15 DIAGNOSIS — H93.13 TINNITUS OF BOTH EARS: ICD-10-CM

## 2022-12-15 PROCEDURE — 1159F PR MEDICATION LIST DOCUMENTED IN MEDICAL RECORD: ICD-10-PCS | Mod: CPTII,95,, | Performed by: NURSE PRACTITIONER

## 2022-12-15 PROCEDURE — 99213 OFFICE O/P EST LOW 20 MIN: CPT | Mod: PBBFAC | Performed by: STUDENT IN AN ORGANIZED HEALTH CARE EDUCATION/TRAINING PROGRAM

## 2022-12-15 PROCEDURE — 1160F PR REVIEW ALL MEDS BY PRESCRIBER/CLIN PHARMACIST DOCUMENTED: ICD-10-PCS | Mod: CPTII,95,, | Performed by: NURSE PRACTITIONER

## 2022-12-15 PROCEDURE — 1159F MED LIST DOCD IN RCRD: CPT | Mod: CPTII,95,, | Performed by: NURSE PRACTITIONER

## 2022-12-15 PROCEDURE — 1160F RVW MEDS BY RX/DR IN RCRD: CPT | Mod: CPTII,95,, | Performed by: NURSE PRACTITIONER

## 2022-12-15 PROCEDURE — 99213 PR OFFICE/OUTPT VISIT, EST, LEVL III, 20-29 MIN: ICD-10-PCS | Mod: 95,,, | Performed by: NURSE PRACTITIONER

## 2022-12-15 PROCEDURE — 4010F PR ACE/ARB THEARPY RXD/TAKEN: ICD-10-PCS | Mod: CPTII,95,, | Performed by: NURSE PRACTITIONER

## 2022-12-15 PROCEDURE — 99213 OFFICE O/P EST LOW 20 MIN: CPT | Mod: 95,,, | Performed by: NURSE PRACTITIONER

## 2022-12-15 PROCEDURE — 4010F ACE/ARB THERAPY RXD/TAKEN: CPT | Mod: CPTII,95,, | Performed by: NURSE PRACTITIONER

## 2022-12-15 RX ORDER — SERTRALINE HYDROCHLORIDE 50 MG/1
50 TABLET, FILM COATED ORAL DAILY
Qty: 30 TABLET | Refills: 1 | Status: SHIPPED | OUTPATIENT
Start: 2022-12-15 | End: 2023-02-25 | Stop reason: SDUPTHER

## 2022-12-15 NOTE — PROGRESS NOTES
Established Patient - Audio Only Telehealth Visit     The patient location is: home  The chief complaint leading to consultation is: anxiety/ depression  Visit type: Virtual visit with audio only (telephone)  Total time spent with patient: 5 minutes        The reason for the audio only service rather than synchronous audio and video virtual visit was related to technical difficulties or patient preference/necessity.     Each patient to whom I provide medical services by telemedicine is:  (1) informed of the relationship between the physician and patient and the respective role of any other health care provider with respect to management of the patient; and (2) notified that they may decline to receive medical services by telemedicine and may withdraw from such care at any time. Patient verbally consented to receive this service via voice-only telephone call.       HPI: 41 yo AAM for f/u anxiety/ depression. He reports he did not start or receive any new medication for anxiety/ depression since last visit. He reports he has upcoming visit with GI provider for his abdominal concerns but mentions he continues to have intermittent discomfort in groin area, onset with bowel movements but states not bothersome. He was recently seen by Urology and ENT clinics. No other concerns.    Medication List with Changes/Refills   New Medications    SERTRALINE (ZOLOFT) 50 MG TABLET    Take 1 tablet (50 mg total) by mouth once daily.   Current Medications    ASPIRIN (ECOTRIN) 81 MG EC TABLET    Take 81 mg by mouth once daily.    DILTIAZEM (CARDIZEM CD) 180 MG 24 HR CAPSULE    Take 180 mg by mouth every evening.    NICOTINE (NICODERM CQ) 21 MG/24 HR    Place 1 patch onto the skin once daily.   Discontinued Medications    AMOXICILLIN-CLAVULANATE 875-125MG (AUGMENTIN) 875-125 MG PER TABLET    SMARTSI Tablet(s) By Mouth Morning-Night    DICYCLOMINE (BENTYL) 20 MG TABLET    SMARTSI Tablet(s) By Mouth Morning-Night    ONDANSETRON  (ZOFRAN-ODT) 4 MG TBDL    Take 4 mg by mouth every 8 (eight) hours as needed.    OXYCODONE-ACETAMINOPHEN (PERCOCET) 5-325 MG PER TABLET    Take 1 tablet by mouth every 4 (four) hours as needed.            Assessment and plan:    Problem List Items Addressed This Visit          Psychiatric    Anxiety and depression - Primary   Prior medications: Buspar (increased anxiety), Lexapro (made him feel worse)  He mentions this is why he smokes marijuana to help him calm down. He states anxiety triggered from life stressors.  He denies receiving prescription for sertraline since last visit; Rx Sertraline 50 mg once daily sent to pharmacy to begin taking once daily   Referral to psychiatry- Appointment scheduled 1/24/22 with Dr. Boone  Advised if any worsening depression /anxiety, SI/HI- call 911 and report to nearest ER           Renal/    Bilateral varicoceles  He reports continued left groin pain onset with bowel movements but denies any bothersome symptoms. He reports discomfort is intermittent and non bothersome.   Evaluated by [facility] urology with US identifying b/l variocele's.       Follow up 4 weeks virtual audio for anxiety/ depression.  Keep appointments as scheduled with specialists.                     This service was not originating from a related E/M service provided within the previous 7 days nor will  to an E/M service or procedure within the next 24 hours or my soonest available appointment.  Prevailing standard of care was able to be met in this audio-only visit.

## 2022-12-15 NOTE — PROGRESS NOTES
[facility]  Otolaryngology-Head & Neck Surgery Clinic Note    Domingo Matias  [id number]  1980    CC: Ringing in ears    HPI: Domingo Matias is a 42 y.o. male PMH DM, HTN referred from audiology for asymmetric hearing loss, hearing aid clearance. Reports bilateral high pitched constant tinnitus. Not intolerable. Interested in hearing aids, but not covered by insurance. Cutting down on smoking, was 1.5 ppd, now 5-10 cig/day, on patch. Was a , wore hearing protection most of the time, but would forget. Had COVID in the past, still has not had return of smell.     Takes ASA, Diltiazem     ROS:   General: Negative except per HPI  Skin: Denies rash, ulcer, or lesion.  Eyes: Denies vision changes or diplopia.  Ears: Negative except per HPI  Nose: Negative except per HPI  Throat/mouth: Negative except per HPI  Cardiovascular: Negative except per HPI  Respiratory: Negative except per HPI  Neck: Negative except per HPI  Endocrine: Negative except per HPI  Neurologic: Negative except per HPI    Review of patient's allergies indicates:   Allergen Reactions    Iodine Nausea And Vomiting       Past Medical History:   Diagnosis Date    Diabetes mellitus     Hypertension        Past Surgical History:   Procedure Laterality Date    APPENDECTOMY         Social History     Socioeconomic History    Marital status:    Tobacco Use    Smoking status: Every Day     Packs/day: 1.50     Years: 30.00     Pack years: 45.00     Types: Cigarettes, Vaping with nicotine    Smokeless tobacco: Former     Types: Chew   Substance and Sexual Activity    Alcohol use: Yes     Comment: occasionally    Drug use: Yes     Types: Marijuana     Social Determinants of Health     Physical Activity: Inactive    Days of Exercise per Week: 0 days    Minutes of Exercise per Session: 0 min       Family History   Problem Relation Age of Onset    Heart failure Mother     Kidney failure Mother        Outpatient  Encounter Medications as of 12/15/2022   Medication Sig Dispense Refill    aspirin (ECOTRIN) 81 MG EC tablet Take 81 mg by mouth once daily.      dicyclomine (BENTYL) 20 mg tablet SMARTSI Tablet(s) By Mouth Morning-Night      nicotine (NICODERM CQ) 21 mg/24 hr Place 1 patch onto the skin once daily. 28 patch 0    amoxicillin-clavulanate 875-125mg (AUGMENTIN) 875-125 mg per tablet SMARTSI Tablet(s) By Mouth Morning-Night      diltiaZEM (CARDIZEM CD) 180 MG 24 hr capsule Take 180 mg by mouth every evening.      ondansetron (ZOFRAN-ODT) 4 MG TbDL Take 4 mg by mouth every 8 (eight) hours as needed.      oxyCODONE-acetaminophen (PERCOCET) 5-325 mg per tablet Take 1 tablet by mouth every 4 (four) hours as needed.       No facility-administered encounter medications on file as of 12/15/2022.       PHYSICAL EXAM:  Vitals:    12/15/22 0956   BP: (!) 158/81   Pulse:    Temp:        General Appearance: well nourished, well-developed, alert, oriented, in no acute distress  Head/Face: NC, AT  Eyes: PEERLA, EOMI, normal conjunctiva  Ears: Hears well at normal conversation volume  AD: external normal, ear canal normal, TM intact, no middle ear fluid  AS: external normal, ear canal normal, TM intact, no middle ear fluid  Nose: septum midline, mild rhinorrhea, bilateral inferior turbinate hypertrophy, no polyps  OC/OP: dentition moderate, no oral lesions, FOM and BOT soft  Nasopharynx, Hypopharynx, and Larynx: indirect visualization attempted, limited view due to patient intolerance  Neck: soft, non-tender, no palpable lymph nodes, thyroid- no nodules or goiter  Neuro: CN II - XII intact  Psychiatric: oriented to time, place and person, no depression, anxiety or agitation    PERTINENT DATA:  Audio 22: Bilateral mid to high frequency sensorineural hearing loss. Hearing is primarily symmetric with the exception of a 40 dB asymmetry at 3k Hz.  AD 95%, AS 92% discrim.    ASSESSMENT:  Domingo Matias is a 42 y.o. 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

## 2022-12-29 ENCOUNTER — OFFICE VISIT (OUTPATIENT)
Dept: UROLOGY | Facility: CLINIC | Age: 42
End: 2022-12-29
Payer: MEDICAID

## 2022-12-29 ENCOUNTER — CLINICAL SUPPORT (OUTPATIENT)
Dept: SMOKING CESSATION | Facility: CLINIC | Age: 42
End: 2022-12-29

## 2022-12-29 VITALS
OXYGEN SATURATION: 98 % | WEIGHT: 251 LBS | BODY MASS INDEX: 34 KG/M2 | HEIGHT: 72 IN | DIASTOLIC BLOOD PRESSURE: 75 MMHG | HEART RATE: 59 BPM | SYSTOLIC BLOOD PRESSURE: 126 MMHG | TEMPERATURE: 98 F | RESPIRATION RATE: 20 BRPM

## 2022-12-29 DIAGNOSIS — Z71.2 ENCOUNTER TO DISCUSS TEST RESULTS: ICD-10-CM

## 2022-12-29 DIAGNOSIS — F17.200 NICOTINE DEPENDENCE: Primary | ICD-10-CM

## 2022-12-29 DIAGNOSIS — I86.1 BILATERAL VARICOCELES: Primary | ICD-10-CM

## 2022-12-29 PROCEDURE — 1159F MED LIST DOCD IN RCRD: CPT | Mod: CPTII,,, | Performed by: UROLOGY

## 2022-12-29 PROCEDURE — 4010F ACE/ARB THERAPY RXD/TAKEN: CPT | Mod: CPTII,,, | Performed by: UROLOGY

## 2022-12-29 PROCEDURE — 3008F BODY MASS INDEX DOCD: CPT | Mod: CPTII,,, | Performed by: UROLOGY

## 2022-12-29 PROCEDURE — 3074F PR MOST RECENT SYSTOLIC BLOOD PRESSURE < 130 MM HG: ICD-10-PCS | Mod: CPTII,,, | Performed by: UROLOGY

## 2022-12-29 PROCEDURE — 3008F PR BODY MASS INDEX (BMI) DOCUMENTED: ICD-10-PCS | Mod: CPTII,,, | Performed by: UROLOGY

## 2022-12-29 PROCEDURE — 1160F RVW MEDS BY RX/DR IN RCRD: CPT | Mod: CPTII,,, | Performed by: UROLOGY

## 2022-12-29 PROCEDURE — 1160F PR REVIEW ALL MEDS BY PRESCRIBER/CLIN PHARMACIST DOCUMENTED: ICD-10-PCS | Mod: CPTII,,, | Performed by: UROLOGY

## 2022-12-29 PROCEDURE — 99401 PREV MED CNSL INDIV APPRX 15: CPT | Mod: S$GLB,,, | Performed by: INTERNAL MEDICINE

## 2022-12-29 PROCEDURE — 3074F SYST BP LT 130 MM HG: CPT | Mod: CPTII,,, | Performed by: UROLOGY

## 2022-12-29 PROCEDURE — 3078F DIAST BP <80 MM HG: CPT | Mod: CPTII,,, | Performed by: UROLOGY

## 2022-12-29 PROCEDURE — 99213 PR OFFICE/OUTPT VISIT, EST, LEVL III, 20-29 MIN: ICD-10-PCS | Mod: S$PBB,,, | Performed by: UROLOGY

## 2022-12-29 PROCEDURE — 99213 OFFICE O/P EST LOW 20 MIN: CPT | Mod: PBBFAC | Performed by: UROLOGY

## 2022-12-29 PROCEDURE — 1159F PR MEDICATION LIST DOCUMENTED IN MEDICAL RECORD: ICD-10-PCS | Mod: CPTII,,, | Performed by: UROLOGY

## 2022-12-29 PROCEDURE — 99213 OFFICE O/P EST LOW 20 MIN: CPT | Mod: S$PBB,,, | Performed by: UROLOGY

## 2022-12-29 PROCEDURE — 3078F PR MOST RECENT DIASTOLIC BLOOD PRESSURE < 80 MM HG: ICD-10-PCS | Mod: CPTII,,, | Performed by: UROLOGY

## 2022-12-29 PROCEDURE — 99401 PR PREVENT COUNSEL,INDIV,15 MIN: ICD-10-PCS | Mod: S$GLB,,, | Performed by: INTERNAL MEDICINE

## 2022-12-29 PROCEDURE — 4010F PR ACE/ARB THEARPY RXD/TAKEN: ICD-10-PCS | Mod: CPTII,,, | Performed by: UROLOGY

## 2022-12-29 NOTE — PROGRESS NOTES
Individual Follow-Up Form    12/29/2022    Quit Date:     Clinical Status of Patient: Outpatient      Continuing Medication: yes  Patches       Target Symptoms: Withdrawal and medication side effects. The following were  rated moderate (3) to severe (4) on TCRS:  Moderate (3): none  Severe (4): none    Comments: Spoke with pt via phone regarding smoking cessation progress.  Pt is smoking 10 cigarettes per day and has not vaped in a while.  Pt remains on tobacco cessation regimen of 21 mg nicotine patch QD.  No adverse effects noted at this time. Pt stated that he purchased the 21 mg nicotine patches over the counter because they were cheaper.  Pt stated that he still has a box left.   Pt doing well with rate reduction and wait times prior to smoking.  Reviewed learned addiction model, personal reasons for quitting, medications, goals, quit date.  Pt will continue to work on rate reduction and decrease by 2 cigarettes each week.    Diagnosis: F17.200    Next Visit: 2 weeks

## 2022-12-29 NOTE — PROGRESS NOTES
"[facility] OFFICE VISIT NOTE  MRN: [id number]  Date: 12/29/2022    Chief Complaint: Scrotal pain for ultrasound results (States pain is a little better)      Subjective:    KALANI Matias is a 42 y.o. male  w/PMH of tobacco use currently on nicotine patch presenting to [facility] urology to discuss imaging results.    Interval hx: Initial presentation to office on 11/30/22 with complaints of left scrotal pain that initially began on 09/25/2022 after sexual activity with his wife.  Ultrasound was completed on 12/01/2022.    Acute complaints: No acute complaints today. Pain has self-resolved. Did not require any OTC medication. Imaging results discussed with him.       Review of Systems  Constitutional: no fever, no chills  CV: no chest pain, no palpitations  Resp: no shortness of breath, no cough, no wheezing  GI: no nausea, no vomiting, no constipation, no diarrhea  : no dysuria, no increased frequency, no fowl odor to urine  Neuro: No headache, no dizziness, no lightheadedness  MSK: no joint pain or swelling  Skin: no rash    Current Medications:   Current Outpatient Medications   Medication Sig Dispense Refill    aspirin (ECOTRIN) 81 MG EC tablet Take 81 mg by mouth once daily.      diltiaZEM (CARDIZEM CD) 180 MG 24 hr capsule Take 180 mg by mouth every evening.      nicotine (NICODERM CQ) 21 mg/24 hr Place 1 patch onto the skin once daily. 28 patch 0    sertraline (ZOLOFT) 50 MG tablet Take 1 tablet (50 mg total) by mouth once daily. (Patient not taking: Reported on 12/29/2022) 30 tablet 1     No current facility-administered medications for this visit.       Objective:  Blood pressure 126/75, pulse (!) 59, temperature 98.2 °F (36.8 °C), temperature source Oral, resp. rate 20, height 5' 11.65" (1.82 m), weight 113.9 kg (251 lb), SpO2 98 %.   Physical Exam  General: in no acute distress  Respiratory: clear to auscultation bilaterally. No wheezes, rhonchi, or rales.  Cardiovascular: regular rate and rhythm. S1/S2 " present. No murmurs, gallops or rubs appreciated  Gastrointestinal: soft, non-tender, non-distended with normal bowel sounds  Musculoskeletal: full range of motion of all extremities and spine without limitation or discomfort  Extremities: No peripheral edema  Neurologic: Alert and oriented x3    Imaging:  Scrotal US (12/01/22)     FINDINGS:  Real-time exam with grayscale, color, and spectral imaging of the scrotum was performed.     The right testicle measures 5.0 x 3.5 x 2.7 cm and the left measures 4.5 x 3.6 x 2.6 cm. Color-flow and arterial waveforms are present within both testicles. There are no findings of torsion. The testicles are homogeneous in echotexture without intratesticular mass.  No significant hydrocele.  Borderline bilateral varicoceles.     Impression:     Borderline bilateral varicocele.  Otherwise no significant sonographic abnormality of the scrotum.    Assessment:   1. Scrotal pain    2. Encounter to discuss test results      Plan:  -Patient doing well, pain has resolved  -Ultrasound results reviewed and agree with final radiology read.   -All question/concerns were addressed during this office visit   -May return to normal activity     Return to clinic in 12 months for routine followup. May schedule earlier appointment if needed.    Megan Andrade MD  [employer]  Resident, HO-3

## 2022-12-29 NOTE — PROGRESS NOTES
I saw and evaluated the patient with the resident.  I discussed with the resident and agree with the resident's history, physical, assessment, findings and care plan as documented in the resident's note.        Willow Kay DO  Urology  [facility]

## 2023-01-10 ENCOUNTER — CLINICAL SUPPORT (OUTPATIENT)
Dept: SMOKING CESSATION | Facility: CLINIC | Age: 43
End: 2023-01-10

## 2023-01-10 DIAGNOSIS — F17.200 NICOTINE DEPENDENCE: Primary | ICD-10-CM

## 2023-01-10 PROCEDURE — 99402 PR PREVENT COUNSEL,INDIV,30 MIN: ICD-10-PCS | Mod: S$GLB,,, | Performed by: INTERNAL MEDICINE

## 2023-01-10 PROCEDURE — 99402 PREV MED CNSL INDIV APPRX 30: CPT | Mod: S$GLB,,, | Performed by: INTERNAL MEDICINE

## 2023-01-10 NOTE — PROGRESS NOTES
Individual Follow-Up Form    1/10/2023    Quit Date:     Clinical Status of Patient: Outpatient      Continuing Medication: yes  Patches       Target Symptoms: Withdrawal and medication side effects. The following were  rated moderate (3) to severe (4) on TCRS:  Moderate (3): none  Severe (4): none    Comments: Patient presents for follow up smoking 10-12 cigarettes per day and is no longer vaping. Pt remains on tobacco cessation medication of 21 mg nicotine patch QD. No adverse effects noted at this time. Pt is not wearing the nicotine patches every day.  Discussed how continuous daily use will aid in smoking cessation. Pt doing well with rate reduction and wait times prior to smoking. Pt encouraged to pick a quit day.  Reviewed strategies, cues, and triggers. Introduced the negative impact of tobacco on health, the health advantages of discontinuing the use of tobacco, time line improved health changes after a quit, withdrawal issues to expect from nicotine and habit, and ways to achieve the goal of a quit.  Pt will continue to work on rate reduction and will decrease by 2 cigarettes each week.    Diagnosis: F17.200    Next Visit: 2 weeks

## 2023-01-31 ENCOUNTER — TELEPHONE (OUTPATIENT)
Dept: SMOKING CESSATION | Facility: CLINIC | Age: 43
End: 2023-01-31
Payer: MEDICAID

## 2023-01-31 NOTE — TELEPHONE ENCOUNTER
Attempted to contact pt regarding phone follow up appt.  Called pt.  No answer.  Left voice message with contact information.

## 2023-02-01 ENCOUNTER — OFFICE VISIT (OUTPATIENT)
Dept: GASTROENTEROLOGY | Facility: CLINIC | Age: 43
End: 2023-02-01
Payer: MEDICAID

## 2023-02-01 VITALS
RESPIRATION RATE: 18 BRPM | WEIGHT: 244 LBS | HEIGHT: 71 IN | TEMPERATURE: 98 F | BODY MASS INDEX: 34.16 KG/M2 | DIASTOLIC BLOOD PRESSURE: 84 MMHG | OXYGEN SATURATION: 100 % | HEART RATE: 71 BPM | SYSTOLIC BLOOD PRESSURE: 134 MMHG

## 2023-02-01 DIAGNOSIS — Z72.0 TOBACCO USER: Primary | ICD-10-CM

## 2023-02-01 DIAGNOSIS — K57.32 DIVERTICULITIS OF SIGMOID COLON: ICD-10-CM

## 2023-02-01 DIAGNOSIS — K92.1 HEMATOCHEZIA: ICD-10-CM

## 2023-02-01 DIAGNOSIS — K92.1 HEMATOCHEZIA: Primary | ICD-10-CM

## 2023-02-01 PROCEDURE — 1159F PR MEDICATION LIST DOCUMENTED IN MEDICAL RECORD: ICD-10-PCS | Mod: CPTII,,, | Performed by: NURSE PRACTITIONER

## 2023-02-01 PROCEDURE — 99215 OFFICE O/P EST HI 40 MIN: CPT | Mod: PBBFAC | Performed by: NURSE PRACTITIONER

## 2023-02-01 PROCEDURE — 3008F PR BODY MASS INDEX (BMI) DOCUMENTED: ICD-10-PCS | Mod: CPTII,,, | Performed by: NURSE PRACTITIONER

## 2023-02-01 PROCEDURE — 99214 PR OFFICE/OUTPT VISIT, EST, LEVL IV, 30-39 MIN: ICD-10-PCS | Mod: S$PBB,,, | Performed by: NURSE PRACTITIONER

## 2023-02-01 PROCEDURE — 99214 OFFICE O/P EST MOD 30 MIN: CPT | Mod: S$PBB,,, | Performed by: NURSE PRACTITIONER

## 2023-02-01 PROCEDURE — 3075F SYST BP GE 130 - 139MM HG: CPT | Mod: CPTII,,, | Performed by: NURSE PRACTITIONER

## 2023-02-01 PROCEDURE — 3075F PR MOST RECENT SYSTOLIC BLOOD PRESS GE 130-139MM HG: ICD-10-PCS | Mod: CPTII,,, | Performed by: NURSE PRACTITIONER

## 2023-02-01 PROCEDURE — 3079F DIAST BP 80-89 MM HG: CPT | Mod: CPTII,,, | Performed by: NURSE PRACTITIONER

## 2023-02-01 PROCEDURE — 3008F BODY MASS INDEX DOCD: CPT | Mod: CPTII,,, | Performed by: NURSE PRACTITIONER

## 2023-02-01 PROCEDURE — 3079F PR MOST RECENT DIASTOLIC BLOOD PRESSURE 80-89 MM HG: ICD-10-PCS | Mod: CPTII,,, | Performed by: NURSE PRACTITIONER

## 2023-02-01 PROCEDURE — 1159F MED LIST DOCD IN RCRD: CPT | Mod: CPTII,,, | Performed by: NURSE PRACTITIONER

## 2023-02-01 RX ORDER — POLYETHYLENE GLYCOL 3350, SODIUM SULFATE, SODIUM CHLORIDE, POTASSIUM CHLORIDE, SODIUM ASCORBATE, AND ASCORBIC ACID 7.5-2.691G
2000 KIT ORAL ONCE
Qty: 1 KIT | Refills: 0 | Status: SHIPPED | OUTPATIENT
Start: 2023-02-01 | End: 2023-02-01

## 2023-02-01 NOTE — ASSESSMENT & PLAN NOTE
CT scan abdomen and pelvis with contrast October 30, 2022 revealed acute diverticulitis noted at proximal segment of sigmoid colon with moderate surrounding inflammatory change without peridiverticular abscess detected.    No leukocytosis on CBC during ED visit.    He was discharged home and prescribed Augmentin 875 mg twice daily for 10 days which he completed with symptomatic relief.  First documented episode of diverticulitis  Recommend soluble fiber supplementation  Colonoscopy  Call with updates  ER precautions provided

## 2023-02-01 NOTE — PROGRESS NOTES
Subjective:       Patient ID: Domingo Matias is a 42 y.o. male.    Chief Complaint: Rectal Bleeding and GI Problem    This 42-year-old  female with a history of diabetes mellitus and hypertension is referred for follow-up of diverticulitis.  He presents unaccompanied.  He presented to the ED at [facility] October 30, 2022 with reports of dysuria, flank pain, and bilateral intermittent testicular discomfort with subjective fever and nausea for the past several weeks.  He was febrile upon arrival to ED and vital signs were otherwise stable.  Physical exam revealed abdominal tenderness to left lower quadrant abdominal region without rebound or guarding.  CT scan abdomen and pelvis with contrast revealed acute diverticulitis noted at proximal segment of sigmoid colon with moderate surrounding inflammatory change without peridiverticular abscess detected.  No leukocytosis on CBC.  He was discharged home and prescribed Augmentin 875 mg twice daily for 10 days, as well as Bentyl, Zofran, and Percocet as needed.    Today, he reports intermittent bright red rectal bleeding with bowel movements for the past few years noted on the tissue after wiping.  Bowel habits are described as formed stools once daily without melena, fecal urgency, fecal incontinence, or pain with defecation.  Has had no further episodes of abdominal pain since his ED visit.  He reports that this was his first episode of diverticulitis.  His appetite is good and his weight is stable.  He denies fever, chills, nausea, vomiting, hematemesis, odynophagia, dysphagia, acid reflux, pyrosis, early satiety, belching, or bloating.  He reports dietary changes over time.    He denies ever having an EGD or colonoscopy done.  He takes aspirin 81 mg daily.  He vapes daily and drinks alcohol on occasion.  He smokes marijuana several times per week.  He denies a family history of IBD, colon polyps, or colon cancer.    Review of patient's allergies indicates:    Allergen Reactions    Iodine Nausea And Vomiting     Past Medical History:   Diagnosis Date    Diabetes mellitus     Hypertension      Past Surgical History:   Procedure Laterality Date    APPENDECTOMY       Family History:   family history includes Diabetes in his mother; Heart failure in his mother; Kidney failure in his mother.    Social History:    reports that he has been smoking cigarettes. He has quit using smokeless tobacco.  His smokeless tobacco use included chew. He reports current alcohol use. He reports current drug use. Drug: Marijuana.    Review of Systems  Negative except as noted in the HPI.      Objective:      Physical Exam  Constitutional:       Appearance: Normal appearance.   HENT:      Head: Normocephalic.      Mouth/Throat:      Mouth: Mucous membranes are moist.   Eyes:      Extraocular Movements: Extraocular movements intact.      Conjunctiva/sclera: Conjunctivae normal.      Pupils: Pupils are equal, round, and reactive to light.   Cardiovascular:      Rate and Rhythm: Normal rate and regular rhythm.      Pulses: Normal pulses.      Heart sounds: Normal heart sounds.   Pulmonary:      Effort: Pulmonary effort is normal.      Breath sounds: Normal breath sounds.   Abdominal:      General: Bowel sounds are normal.      Palpations: Abdomen is soft.   Musculoskeletal:         General: Normal range of motion.      Cervical back: Normal range of motion and neck supple.   Skin:     General: Skin is warm and dry.   Neurological:      General: No focal deficit present.      Mental Status: He is alert and oriented to person, place, and time.   Psychiatric:         Mood and Affect: Mood normal.         Behavior: Behavior normal.         Thought Content: Thought content normal.         Judgment: Judgment normal.       Home Medications:     Current Outpatient Medications   Medication Sig    aspirin (ECOTRIN) 81 MG EC tablet Take 81 mg by mouth once daily.    diltiaZEM (CARDIZEM CD) 180 MG 24 hr  capsule Take 180 mg by mouth every evening.    nicotine (NICODERM CQ) 21 mg/24 hr Place 1 patch onto the skin once daily.    sertraline (ZOLOFT) 50 MG tablet Take 1 tablet (50 mg total) by mouth once daily. (Patient not taking: Reported on 12/29/2022)     Laboratory Results:     Recent Results (from the past 4032 hour(s))   Urinalysis, Reflex to Urine Culture Urine, Clean Catch    Collection Time: 09/25/22 10:04 PM    Specimen: Urine   Result Value Ref Range    Color, UA Straw (A) Yellow, Colorless, Other, Clear    Appearance, UA Clear Clear    Specific Gravity, UA <=1.005     pH, UA 6.0 5.0, 5.5, 6.0, 6.5, 7.0, 7.5, 8.0, 8.5    Protein, UA Negative Negative, 300  mg/dL    Glucose, UA Negative Negative, Normal mg/dL    Ketones, UA Negative Negative, +1, +2, +3, +4, +5, >=160, >=80 mg/dL    Blood, UA Negative Negative unit/L    Bilirubin, UA Negative Negative mg/dL    Urobilinogen, UA 2.0 (A) 0.2, 1.0, Normal mg/dL    Nitrites, UA Negative Negative    Leukocyte Esterase, UA Negative Negative, 75  unit/L   Drug Screen, Urine    Collection Time: 09/25/22 10:04 PM   Result Value Ref Range    Amphetamines, Urine Negative Negative    Barbituates, Urine Negative Negative    Benzodiazepine, Urine Negative Negative    Cannabinoids, Urine Positive (A) Negative    Cocaine, Urine Negative Negative    MDMA, Urine Negative Negative    Opiates, Urine Negative Negative    Phencyclidine, Urine Negative Negative    pH, Urine 6.0 3.0 - 11.0    Specific Gravity, Urine Auto <=1.005 1.001 - 1.035   Comprehensive Metabolic Panel    Collection Time: 11/08/22  7:06 AM   Result Value Ref Range    Sodium Level 137 136 - 145 mmol/L    Potassium Level 4.4 3.5 - 5.1 mmol/L    Chloride 98 98 - 107 mmol/L    Carbon Dioxide 26 22 - 29 mmol/L    Glucose Level 109 (H) 74 - 100 mg/dL    Blood Urea Nitrogen 10.5 8.9 - 20.6 mg/dL    Creatinine 1.20 (H) 0.73 - 1.18 mg/dL    Calcium Level Total 10.2 8.4 - 10.2 mg/dL    Protein Total 7.3 6.4 - 8.3 gm/dL     Albumin Level 4.1 3.5 - 5.0 gm/dL    Globulin 3.2 2.4 - 3.5 gm/dL    Albumin/Globulin Ratio 1.3 1.1 - 2.0 ratio    Bilirubin Total 0.8 <=1.5 mg/dL    Alkaline Phosphatase 58 40 - 150 unit/L    Alanine Aminotransferase 23 0 - 55 unit/L    Aspartate Aminotransferase 16 5 - 34 unit/L    eGFR >60 mls/min/1.73/m2   Hepatitis Panel, Acute    Collection Time: 11/08/22  7:06 AM   Result Value Ref Range    Hepatitis A IgM Nonreactive Nonreactive    Hepatitis B Core IgM Nonreactive Nonreactive    Hepatitis B Surface Antigen Nonreactive Nonreactive    Hepatitis C Antibody Nonreactive Nonreactive   HIV 1/2 Ag/Ab (4th Gen)    Collection Time: 11/08/22  7:06 AM   Result Value Ref Range    HIV Nonreactive Nonreactive   Lipid Panel    Collection Time: 11/08/22  7:06 AM   Result Value Ref Range    Cholesterol Total 152 <=200 mg/dL    HDL Cholesterol 48 35 - 60 mg/dL    Triglyceride 60 34 - 140 mg/dL    Cholesterol/HDL Ratio 3 0 - 5    Very Low Density Lipoprotein 12     LDL Cholesterol 92.00 50.00 - 140.00 mg/dL   SYPHILIS ANTIBODY (WITH REFLEX RPR)    Collection Time: 11/08/22  7:06 AM   Result Value Ref Range    Syphilis Antibody Nonreactive Nonreactive, Equivocal   TSH    Collection Time: 11/08/22  7:06 AM   Result Value Ref Range    Thyroid Stimulating Hormone 0.7432 0.3500 - 4.9400 uIU/mL   Hemoglobin A1C    Collection Time: 11/08/22  7:06 AM   Result Value Ref Range    Hemoglobin A1c 5.7 <=7.0 %    Estimated Average Glucose 116.9 mg/dL   CBC with Differential    Collection Time: 11/08/22  7:06 AM   Result Value Ref Range    WBC 8.8 4.5 - 11.5 x10(3)/mcL    RBC 5.38 4.70 - 6.10 x10(6)/mcL    Hgb 15.4 14.0 - 18.0 gm/dL    Hct 46.1 42.0 - 52.0 %    MCV 85.7 80.0 - 94.0 fL    MCH 28.6 27.0 - 31.0 pg    MCHC 33.4 33.0 - 36.0 mg/dL    RDW 13.2 11.5 - 17.0 %    Platelet 309 130 - 400 x10(3)/mcL    MPV 10.6 (H) 7.4 - 10.4 fL    Neut % 58.9 %    Lymph % 29.0 %    Mono % 10.3 %    Eos % 0.8 %    Basophil % 0.5 %    Lymph # 2.56 0.6 -  4.6 x10(3)/mcL    Neut # 5.2 2.1 - 9.2 x10(3)/mcL    Mono # 0.91 0.1 - 1.3 x10(3)/mcL    Eos # 0.07 0 - 0.9 x10(3)/mcL    Baso # 0.04 0 - 0.2 x10(3)/mcL    IG# 0.04 0 - 0.04 x10(3)/mcL    IG% 0.5 %    NRBC% 0.0 %   Pathologist Interpretation    Collection Time: 11/08/22  7:06 AM   Result Value Ref Range    Pathology Review       No serological evidence of recent or past hepatitis A, B, or C infection.    Nicanor Meehan M.D.        Assessment/Plan:     Problem List Items Addressed This Visit          GI    Diverticulitis of sigmoid colon     CT scan abdomen and pelvis with contrast October 30, 2022 revealed acute diverticulitis noted at proximal segment of sigmoid colon with moderate surrounding inflammatory change without peridiverticular abscess detected.    No leukocytosis on CBC during ED visit.    He was discharged home and prescribed Augmentin 875 mg twice daily for 10 days which he completed with symptomatic relief.  First documented episode of diverticulitis  Recommend soluble fiber supplementation  Colonoscopy  Call with updates  ER precautions provided         Relevant Orders    Case Request Endoscopy: COLONOSCOPY (Completed)    Hematochezia     See above         Relevant Orders    Case Request Endoscopy: COLONOSCOPY (Completed)       Other    Tobacco user - Primary     Recommend tobacco cessation.         Relevant Orders    Ambulatory referral/consult to Smoking Cessation Program

## 2023-02-14 ENCOUNTER — TELEPHONE (OUTPATIENT)
Dept: SMOKING CESSATION | Facility: CLINIC | Age: 43
End: 2023-02-14
Payer: MEDICAID

## 2023-02-14 NOTE — TELEPHONE ENCOUNTER
Attempted to contact pt to reschedule his missed follow up appt.  Called pt.  No answer.  Left voice message with contact information.

## 2023-03-07 ENCOUNTER — CLINICAL SUPPORT (OUTPATIENT)
Dept: SMOKING CESSATION | Facility: CLINIC | Age: 43
End: 2023-03-07

## 2023-03-07 DIAGNOSIS — F17.200 NICOTINE DEPENDENCE: Primary | ICD-10-CM

## 2023-03-07 PROCEDURE — 99401 PR PREVENT COUNSEL,INDIV,15 MIN: ICD-10-PCS | Mod: S$GLB,,,

## 2023-03-07 PROCEDURE — 99401 PREV MED CNSL INDIV APPRX 15: CPT | Mod: S$GLB,,,

## 2023-03-07 NOTE — PROGRESS NOTES
Individual Follow-Up Form    3/7/2023    Quit Date:     Clinical Status of Patient: Outpatient    Continuing Medication: no     Target Symptoms: Withdrawal and medication side effects. The following were  rated moderate (3) to severe (4) on TCRS:  Moderate (3): none  Severe (4): none    Comments: Pt called requesting to have this follow up appointment over the phone rather coming into the office.  Pt stated that his mom has heart failure and there is nothing else that the doctors can do.  Pt stated that he has been depressed about that.  Pt has increased in his smoking.  He is smoking 18-20 cigarettes per day.  Pt is not currently vaping.  Pt is not currently using his nicotine patches.  Pt does not think that the patches work.  Discussed trying Wellbutrin or Chantix.  Pt stated that he does not want to continue in the program at this time.  Pt stated that he is not focused on trying to quit right now.  Pt is dealing with his mother's health issues which has him stressed. Briefly reviewed strategies and tips to help pt work on decreasing the amount of cigarettes he smokes.  Pt has our contact information if he decides to rejoin the program or needs any additional support.      Diagnosis: F17.200    Next Visit: 2 weeks

## 2023-03-08 ENCOUNTER — TELEPHONE (OUTPATIENT)
Dept: SMOKING CESSATION | Facility: CLINIC | Age: 43
End: 2023-03-08
Payer: MEDICAID

## 2023-03-09 ENCOUNTER — CLINICAL SUPPORT (OUTPATIENT)
Dept: SMOKING CESSATION | Facility: CLINIC | Age: 43
End: 2023-03-09

## 2023-03-09 DIAGNOSIS — F17.200 NICOTINE DEPENDENCE: Primary | ICD-10-CM

## 2023-03-09 PROCEDURE — 99999 PR PBB SHADOW E&M-EST. PATIENT-LVL I: CPT | Mod: PBBFAC,,,

## 2023-03-09 PROCEDURE — 99407 BEHAV CHNG SMOKING > 10 MIN: CPT | Mod: S$GLB,,,

## 2023-03-09 PROCEDURE — 99407 PR TOBACCO USE CESSATION INTENSIVE >10 MINUTES: ICD-10-PCS | Mod: S$GLB,,,

## 2023-03-09 PROCEDURE — 99999 PR PBB SHADOW E&M-EST. PATIENT-LVL I: ICD-10-PCS | Mod: PBBFAC,,,

## 2023-03-09 NOTE — PROGRESS NOTES
Spoke with patient today in regard to smoking cessation progress for 3 month telephone follow up, he states not tobacco free. Patient states he has a lot going on with his mother being sick and has no interest in returning to the program at this time. Informed patient of benefit period, future follow ups, and contact information if any further help or support is needed. Will complete smart form for 3 month follow up on Quit attempt #1.

## 2023-04-10 ENCOUNTER — OFFICE VISIT (OUTPATIENT)
Dept: INTERNAL MEDICINE | Facility: CLINIC | Age: 43
End: 2023-04-10
Payer: MEDICAID

## 2023-04-10 VITALS
SYSTOLIC BLOOD PRESSURE: 133 MMHG | DIASTOLIC BLOOD PRESSURE: 82 MMHG | TEMPERATURE: 98 F | BODY MASS INDEX: 32.99 KG/M2 | HEART RATE: 68 BPM | HEIGHT: 71 IN | WEIGHT: 235.63 LBS | RESPIRATION RATE: 18 BRPM

## 2023-04-10 DIAGNOSIS — R07.89 OTHER CHEST PAIN: ICD-10-CM

## 2023-04-10 DIAGNOSIS — E66.9 CLASS 1 OBESITY WITHOUT SERIOUS COMORBIDITY WITH BODY MASS INDEX (BMI) OF 32.0 TO 32.9 IN ADULT, UNSPECIFIED OBESITY TYPE: ICD-10-CM

## 2023-04-10 DIAGNOSIS — Z12.5 PROSTATE CANCER SCREENING: ICD-10-CM

## 2023-04-10 DIAGNOSIS — F17.219 CIGARETTE NICOTINE DEPENDENCE WITH NICOTINE-INDUCED DISORDER: ICD-10-CM

## 2023-04-10 DIAGNOSIS — F32.A ANXIETY AND DEPRESSION: Primary | ICD-10-CM

## 2023-04-10 DIAGNOSIS — F41.9 ANXIETY AND DEPRESSION: Primary | ICD-10-CM

## 2023-04-10 DIAGNOSIS — Z00.00 WELL ADULT EXAM: ICD-10-CM

## 2023-04-10 DIAGNOSIS — Z11.9 SCREENING EXAMINATION FOR INFECTIOUS DISEASE: ICD-10-CM

## 2023-04-10 PROBLEM — Z72.0 TOBACCO USER: Status: RESOLVED | Noted: 2022-11-03 | Resolved: 2023-04-10

## 2023-04-10 PROCEDURE — 1160F RVW MEDS BY RX/DR IN RCRD: CPT | Mod: CPTII,,, | Performed by: NURSE PRACTITIONER

## 2023-04-10 PROCEDURE — 1160F PR REVIEW ALL MEDS BY PRESCRIBER/CLIN PHARMACIST DOCUMENTED: ICD-10-PCS | Mod: CPTII,,, | Performed by: NURSE PRACTITIONER

## 2023-04-10 PROCEDURE — 1159F MED LIST DOCD IN RCRD: CPT | Mod: CPTII,,, | Performed by: NURSE PRACTITIONER

## 2023-04-10 PROCEDURE — 3079F PR MOST RECENT DIASTOLIC BLOOD PRESSURE 80-89 MM HG: ICD-10-PCS | Mod: CPTII,,, | Performed by: NURSE PRACTITIONER

## 2023-04-10 PROCEDURE — 3075F SYST BP GE 130 - 139MM HG: CPT | Mod: CPTII,,, | Performed by: NURSE PRACTITIONER

## 2023-04-10 PROCEDURE — 3008F BODY MASS INDEX DOCD: CPT | Mod: CPTII,,, | Performed by: NURSE PRACTITIONER

## 2023-04-10 PROCEDURE — 3008F PR BODY MASS INDEX (BMI) DOCUMENTED: ICD-10-PCS | Mod: CPTII,,, | Performed by: NURSE PRACTITIONER

## 2023-04-10 PROCEDURE — 99214 OFFICE O/P EST MOD 30 MIN: CPT | Mod: PBBFAC | Performed by: NURSE PRACTITIONER

## 2023-04-10 PROCEDURE — 99214 PR OFFICE/OUTPT VISIT, EST, LEVL IV, 30-39 MIN: ICD-10-PCS | Mod: S$PBB,,, | Performed by: NURSE PRACTITIONER

## 2023-04-10 PROCEDURE — 3075F PR MOST RECENT SYSTOLIC BLOOD PRESS GE 130-139MM HG: ICD-10-PCS | Mod: CPTII,,, | Performed by: NURSE PRACTITIONER

## 2023-04-10 PROCEDURE — 1159F PR MEDICATION LIST DOCUMENTED IN MEDICAL RECORD: ICD-10-PCS | Mod: CPTII,,, | Performed by: NURSE PRACTITIONER

## 2023-04-10 PROCEDURE — 99214 OFFICE O/P EST MOD 30 MIN: CPT | Mod: S$PBB,,, | Performed by: NURSE PRACTITIONER

## 2023-04-10 PROCEDURE — 3079F DIAST BP 80-89 MM HG: CPT | Mod: CPTII,,, | Performed by: NURSE PRACTITIONER

## 2023-04-10 RX ORDER — DILTIAZEM HYDROCHLORIDE 180 MG/1
180 CAPSULE, EXTENDED RELEASE ORAL
COMMUNITY
Start: 2023-04-04 | End: 2023-06-01 | Stop reason: SDUPTHER

## 2023-04-10 NOTE — PROGRESS NOTES
Galina L Lorene, NP   [facility]  [address] [facility] [address]      PATIENT NAME: Domingo Matias  : 1980  DATE: 4/10/23  MRN: [id number]        Reason for Visit / Chief Complaint: Follow-up and Anxiety       History of Present Illness / Problem Focused Workflow     Domingo Matias presents to the clinic with Follow-up and Anxiety     41 yo AAM requested appointment for concerns. PMH includes HTN, CP, anxiety, obesity, tobacco abuse. He states he stopped Sertraline because he did not like the way it made him feel. [place] like dose too strong? He has occasional episodes of bilateral chest pain, which he notices when he smokes. He also mentions he smokes when he is anxious. Stressed. Lost his mother recently. Having some legal issues. He states he is hopeful things will get better soon. He smokes marijuana which helps to calm him. He would like to try more natural remedies for anxiety. He plans to attend appointment next month with psychiatrist. He is eating healthier- meats and vegetables. Wt loss 21# since last visit. Denies any dizziness, cough, SOB.     Other providers  Cardiologist- [facility]?       Review of Systems     Review of Systems   Constitutional:  Negative for appetite change, chills, fatigue, fever and unexpected weight change.   HENT:  Negative for congestion, ear pain, hearing loss, sinus pressure, sore throat and tinnitus.    Respiratory:  Negative for cough, chest tightness, shortness of breath and wheezing.    Cardiovascular:  Positive for chest pain. Negative for palpitations and leg swelling.   Gastrointestinal:  Negative for abdominal distention, abdominal pain, blood in stool, constipation, diarrhea, nausea and vomiting.   Genitourinary:  Negative for dysuria and hematuria.   Musculoskeletal:  Negative for arthralgias and myalgias.   Skin:  Negative for pallor.   Allergic/Immunologic: Negative for environmental allergies.  "  Neurological:  Negative for dizziness, syncope, weakness, numbness and headaches.   Hematological:  Negative for adenopathy. Does not bruise/bleed easily.   Psychiatric/Behavioral:  Negative for agitation, behavioral problems, confusion, hallucinations, sleep disturbance and suicidal ideas. The patient is nervous/anxious.      Medical / Social / Family History     ----------------------------  Diabetes mellitus  Hypertension     Past Surgical History:   Procedure Laterality Date    APPENDECTOMY         Social History     Socioeconomic History    Marital status:    Occupational History    Occupation: unemployed   Tobacco Use    Smoking status: Every Day     Packs/day: 1.00     Types: Cigarettes    Smokeless tobacco: Former     Types: Chew    Tobacco comments:     Nicotine patches 19   Substance and Sexual Activity    Alcohol use: Yes     Comment: occasionally    Drug use: Yes     Types: Marijuana     Comment: occassionally    Sexual activity: Yes     Partners: Female     Social Determinants of Health     Physical Activity: Inactive    Days of Exercise per Week: 0 days    Minutes of Exercise per Session: 0 min        Family History   Problem Relation Age of Onset    Heart failure Mother     Kidney failure Mother     Diabetes Mother         Medications and Allergies     Medications  Current Outpatient Medications   Medication Instructions    aspirin (ECOTRIN) 81 mg, Oral, Daily    diltiaZEM (CARDIZEM CD) 180 mg, Oral, Nightly    diltiaZEM (DILACOR XR) 180 mg, Oral       Allergies  Review of patient's allergies indicates:   Allergen Reactions    Iodine Nausea And Vomiting       Physical Examination     Visit Vitals  /82 (BP Location: Left arm, Patient Position: Sitting)   Pulse 68   Temp 98.1 °F (36.7 °C)   Resp 18   Ht 5' 11" (1.803 m)   Wt 106.9 kg (235 lb 9.6 oz)   BMI 32.86 kg/m²       Physical Exam  Vitals and nursing note reviewed.   Constitutional:       Appearance: Normal appearance. He is not " ill-appearing.   HENT:      Head: Normocephalic.   Eyes:      Extraocular Movements: Extraocular movements intact.      Conjunctiva/sclera: Conjunctivae normal.      Pupils: Pupils are equal, round, and reactive to light.   Cardiovascular:      Rate and Rhythm: Normal rate and regular rhythm.      Pulses: Normal pulses.   Pulmonary:      Effort: Pulmonary effort is normal. No respiratory distress.      Breath sounds: Normal breath sounds.   Musculoskeletal:         General: Normal range of motion.      Cervical back: Normal range of motion and neck supple.      Right lower leg: No edema.      Left lower leg: No edema.   Skin:     General: Skin is warm and dry.      Capillary Refill: Capillary refill takes less than 2 seconds.   Neurological:      Mental Status: He is alert and oriented to person, place, and time. Mental status is at baseline.      Motor: No weakness.   Psychiatric:         Mood and Affect: Mood normal.         Behavior: Behavior normal.         Thought Content: Thought content normal.         Judgment: Judgment normal.         Results     Lab Results   Component Value Date    WBC 8.8 11/08/2022    RBC 5.38 11/08/2022    HGB 15.4 11/08/2022    HCT 46.1 11/08/2022    MCV 85.7 11/08/2022    MCH 28.6 11/08/2022    MCHC 33.4 11/08/2022    RDW 13.2 11/08/2022     11/08/2022    MPV 10.6 (H) 11/08/2022     Sodium Level   Date Value Ref Range Status   11/08/2022 137 136 - 145 mmol/L Final     Potassium Level   Date Value Ref Range Status   11/08/2022 4.4 3.5 - 5.1 mmol/L Final     Carbon Dioxide   Date Value Ref Range Status   11/08/2022 26 22 - 29 mmol/L Final     Blood Urea Nitrogen   Date Value Ref Range Status   11/08/2022 10.5 8.9 - 20.6 mg/dL Final     Creatinine   Date Value Ref Range Status   11/08/2022 1.20 (H) 0.73 - 1.18 mg/dL Final     Calcium Level Total   Date Value Ref Range Status   11/08/2022 10.2 8.4 - 10.2 mg/dL Final     Albumin Level   Date Value Ref Range Status   11/08/2022 4.1  3.5 - 5.0 gm/dL Final     Bilirubin Total   Date Value Ref Range Status   11/08/2022 0.8 <=1.5 mg/dL Final     Alkaline Phosphatase   Date Value Ref Range Status   11/08/2022 58 40 - 150 unit/L Final     Aspartate Aminotransferase   Date Value Ref Range Status   11/08/2022 16 5 - 34 unit/L Final     Alanine Aminotransferase   Date Value Ref Range Status   11/08/2022 23 0 - 55 unit/L Final     Lab Results   Component Value Date    CHOL 152 11/08/2022     Lab Results   Component Value Date    HDL 48 11/08/2022     No results found for: LDLCALC  Lab Results   Component Value Date    TRIG 60 11/08/2022     No results found for: CHOLHDL  Lab Results   Component Value Date    TSH 0.7432 11/08/2022     Lab Results   Component Value Date    PHUR 6.0 02/27/2022    PROTEINUA Negative 09/25/2022    GLUCUA Negative 02/27/2022    KETONESU Negative 02/27/2022    OCCULTUA Negative 02/27/2022    NITRITE Negative 02/27/2022    LEUKOCYTESUR Negative 09/25/2022     Lab Results   Component Value Date    HGBA1C 5.7 11/08/2022     No results found for: MICROALBUR, [id number]   No results found for this or any previous visit.         Assessment       ICD-10-CM ICD-9-CM   1. Anxiety and depression  F41.9 300.00    F32.A 311   2. Cigarette nicotine dependence with nicotine-induced disorder  F17.219 292.9   3. Class 1 obesity without serious comorbidity with body mass index (BMI) of 32.0 to 32.9 in adult, unspecified obesity type  E66.9 278.00    Z68.32 V85.32   4. Other chest pain  R07.89 786.59   5. Well adult exam  Z00.00 V70.0   6. Screening examination for infectious disease  Z11.9 V75.9   7. Prostate cancer screening  Z12.5 V76.44       Plan       1. Anxiety and depression  He stopped sertraline since last visit. Dose too strong?   Prior medications: Buspar (increased anxiety), Lexapro (made him feel worse)  Missed appointment with Dr. Boone 1/24/22. R/S 5/12/23  Denies SI/HI  Not interested in medications; wants to try natural  remedies; uses marijuana- discouraged   Discussed relaxation techniques/ deep breathing  Keep appt with psychiatrist  Notify provider for any worsening symptoms; ER immediately for SI/HI     2. Other chest pain  Likely anxiety related as patient describes episodes when he is anxious/ smoking  Prior stress test evaluation with cardiologist which he states was normal- encouraged to f/u with cardiologist regarding  ER precautions discussed     3. Obesity  Wt loss 21# since November 2022- trying to lose; eating healthier- meats/ vegetables  Encouraged regular exercise/ quit smoking     4. Cigarette nicotine dependence with nicotine induced disorder  1 ppd since teenager  Stopped nicotine patches- unable to afford  Encouraged cessation      Future Appointments   Date Time Provider Department Center   5/12/2023  9:00 AM Randy Boone MD [facility]   11/13/2023  9:00 AM Galina Paulson NP [facility]        Follow up in about 7 months (around 11/10/2023) for for wellness with fasting labs before visit.    Signature:  Galina Paulson NP  [facility]  [address] [facility] [address]    Date of encounter: 4/10/23

## 2023-05-06 ENCOUNTER — HOSPITAL ENCOUNTER (EMERGENCY)
Facility: HOSPITAL | Age: 43
Discharge: HOME | End: 2023-05-06
Attending: FAMILY MEDICINE
Payer: MEDICAID

## 2023-05-06 VITALS
RESPIRATION RATE: 16 BRPM | OXYGEN SATURATION: 100 % | TEMPERATURE: 97 F | WEIGHT: 229.75 LBS | DIASTOLIC BLOOD PRESSURE: 74 MMHG | SYSTOLIC BLOOD PRESSURE: 129 MMHG | HEART RATE: 64 BPM | BODY MASS INDEX: 31.12 KG/M2 | HEIGHT: 72 IN

## 2023-05-06 DIAGNOSIS — K57.92 DIVERTICULITIS: Primary | ICD-10-CM

## 2023-05-06 DIAGNOSIS — R10.9 ABDOMINAL PAIN: ICD-10-CM

## 2023-05-06 LAB
ALBUMIN SERPL-MCNC: 4 G/DL (ref 3.5–5)
ALBUMIN/GLOB SERPL: 1.3 RATIO (ref 1.1–2)
ALP SERPL-CCNC: 52 UNIT/L (ref 40–150)
ALT SERPL-CCNC: 19 UNIT/L (ref 0–55)
APPEARANCE UR: CLEAR
AST SERPL-CCNC: 17 UNIT/L (ref 5–34)
BACTERIA #/AREA URNS AUTO: ABNORMAL /HPF
BASOPHILS # BLD AUTO: 0.05 X10(3)/MCL
BASOPHILS NFR BLD AUTO: 0.6 %
BILIRUB UR QL STRIP.AUTO: NEGATIVE MG/DL
BILIRUBIN DIRECT+TOT PNL SERPL-MCNC: 0.9 MG/DL
BUN SERPL-MCNC: 8 MG/DL (ref 8.9–20.6)
CALCIUM SERPL-MCNC: 9.8 MG/DL (ref 8.4–10.2)
CHLORIDE SERPL-SCNC: 105 MMOL/L (ref 98–107)
CO2 SERPL-SCNC: 23 MMOL/L (ref 22–29)
COLOR UR AUTO: YELLOW
CREAT SERPL-MCNC: 1.04 MG/DL (ref 0.73–1.18)
EOSINOPHIL # BLD AUTO: 0.05 X10(3)/MCL (ref 0–0.9)
EOSINOPHIL NFR BLD AUTO: 0.6 %
ERYTHROCYTE [DISTWIDTH] IN BLOOD BY AUTOMATED COUNT: 13.4 % (ref 11.5–17)
GFR SERPLBLD CREATININE-BSD FMLA CKD-EPI: >60 MLS/MIN/1.73/M2
GLOBULIN SER-MCNC: 3.1 GM/DL (ref 2.4–3.5)
GLUCOSE SERPL-MCNC: 93 MG/DL (ref 74–100)
GLUCOSE UR QL STRIP.AUTO: NORMAL MG/DL
HCT VFR BLD AUTO: 43.8 % (ref 42–52)
HGB BLD-MCNC: 14.5 G/DL (ref 14–18)
HYALINE CASTS #/AREA URNS LPF: ABNORMAL /LPF
IMM GRANULOCYTES # BLD AUTO: 0.02 X10(3)/MCL (ref 0–0.04)
IMM GRANULOCYTES NFR BLD AUTO: 0.2 %
KETONES UR QL STRIP.AUTO: ABNORMAL MG/DL
LEUKOCYTE ESTERASE UR QL STRIP.AUTO: NEGATIVE UNIT/L
LIPASE SERPL-CCNC: 10 U/L
LYMPHOCYTES # BLD AUTO: 2.26 X10(3)/MCL (ref 0.6–4.6)
LYMPHOCYTES NFR BLD AUTO: 26.9 %
MAGNESIUM SERPL-MCNC: 2 MG/DL (ref 1.6–2.6)
MCH RBC QN AUTO: 28.9 PG (ref 27–31)
MCHC RBC AUTO-ENTMCNC: 33.1 G/DL (ref 33–36)
MCV RBC AUTO: 87.4 FL (ref 80–94)
MONOCYTES # BLD AUTO: 0.86 X10(3)/MCL (ref 0.1–1.3)
MONOCYTES NFR BLD AUTO: 10.3 %
MUCOUS THREADS URNS QL MICRO: ABNORMAL /LPF
NEUTROPHILS # BLD AUTO: 5.15 X10(3)/MCL (ref 2.1–9.2)
NEUTROPHILS NFR BLD AUTO: 61.4 %
NITRITE UR QL STRIP.AUTO: NEGATIVE
NRBC BLD AUTO-RTO: 0 %
PH UR STRIP.AUTO: 5.5 [PH]
PLATELET # BLD AUTO: 241 X10(3)/MCL (ref 130–400)
PMV BLD AUTO: 10.8 FL (ref 7.4–10.4)
POTASSIUM SERPL-SCNC: 4.4 MMOL/L (ref 3.5–5.1)
PROT SERPL-MCNC: 7.1 GM/DL (ref 6.4–8.3)
PROT UR QL STRIP.AUTO: ABNORMAL MG/DL
RBC # BLD AUTO: 5.01 X10(6)/MCL (ref 4.7–6.1)
RBC #/AREA URNS AUTO: ABNORMAL /HPF
RBC UR QL AUTO: NEGATIVE UNIT/L
SODIUM SERPL-SCNC: 137 MMOL/L (ref 136–145)
SP GR UR STRIP.AUTO: 1.02
SQUAMOUS #/AREA URNS LPF: ABNORMAL /HPF
TROPONIN I SERPL-MCNC: <0.01 NG/ML (ref 0–0.04)
UROBILINOGEN UR STRIP-ACNC: NORMAL MG/DL
WBC # SPEC AUTO: 8.39 X10(3)/MCL (ref 4.5–11.5)
WBC #/AREA URNS AUTO: ABNORMAL /HPF

## 2023-05-06 PROCEDURE — 80053 COMPREHEN METABOLIC PANEL: CPT | Performed by: PHYSICIAN ASSISTANT

## 2023-05-06 PROCEDURE — 84484 ASSAY OF TROPONIN QUANT: CPT | Performed by: PHYSICIAN ASSISTANT

## 2023-05-06 PROCEDURE — 99285 EMERGENCY DEPT VISIT HI MDM: CPT | Mod: 25

## 2023-05-06 PROCEDURE — 96374 THER/PROPH/DIAG INJ IV PUSH: CPT

## 2023-05-06 PROCEDURE — 83735 ASSAY OF MAGNESIUM: CPT | Performed by: PHYSICIAN ASSISTANT

## 2023-05-06 PROCEDURE — 81001 URINALYSIS AUTO W/SCOPE: CPT | Performed by: PHYSICIAN ASSISTANT

## 2023-05-06 PROCEDURE — 85025 COMPLETE CBC W/AUTO DIFF WBC: CPT | Performed by: PHYSICIAN ASSISTANT

## 2023-05-06 PROCEDURE — 93005 ELECTROCARDIOGRAM TRACING: CPT

## 2023-05-06 PROCEDURE — 63600175 PHARM REV CODE 636 W HCPCS: Performed by: PHYSICIAN ASSISTANT

## 2023-05-06 PROCEDURE — 25500020 PHARM REV CODE 255

## 2023-05-06 PROCEDURE — 83690 ASSAY OF LIPASE: CPT | Performed by: PHYSICIAN ASSISTANT

## 2023-05-06 RX ORDER — DIPHENHYDRAMINE HYDROCHLORIDE 50 MG/ML
12.5 INJECTION INTRAMUSCULAR; INTRAVENOUS
Status: COMPLETED | OUTPATIENT
Start: 2023-05-06 | End: 2023-05-06

## 2023-05-06 RX ORDER — HYDROCODONE BITARTRATE AND ACETAMINOPHEN 5; 325 MG/1; MG/1
1 TABLET ORAL EVERY 6 HOURS PRN
Qty: 8 TABLET | Refills: 0 | Status: SHIPPED | OUTPATIENT
Start: 2023-05-06 | End: 2023-05-11

## 2023-05-06 RX ORDER — AMOXICILLIN AND CLAVULANATE POTASSIUM 875; 125 MG/1; MG/1
1 TABLET, FILM COATED ORAL 2 TIMES DAILY
Qty: 20 TABLET | Refills: 0 | Status: SHIPPED | OUTPATIENT
Start: 2023-05-06 | End: 2023-06-01

## 2023-05-06 RX ADMIN — DIPHENHYDRAMINE HYDROCHLORIDE 12.5 MG: 50 INJECTION INTRAMUSCULAR; INTRAVENOUS at 02:05

## 2023-05-06 RX ADMIN — IOHEXOL: 350 INJECTION, SOLUTION INTRAVENOUS at 02:05

## 2023-05-06 NOTE — ED PROVIDER NOTES
Encounter Date: 5/6/2023       History     Chief Complaint   Patient presents with    Abdominal Pain     Abd pain x 3 days, increase pain this am.  Also lt flank pain.     41 yo M w/ PMHx for DM, HTN, diverticulitis, lumbar DDD & obesity presents to ED c/o 3 day hx of pain in LLQ, L flank & L lower back. Endorses constipation which has been mildly relieved w/ fiber supplement. Also endorses decreased appetite since yesterday & mild nausea, but denies any vomiting. Denies CP, SOB, palpitations, diaphoresis, syncope, orthopnea, edema, abdominal distension, jaundice, blood in stool, diarrhea, anorexia, dysuria, hematuria, testicular pain/swelling, F/C, generalized weakness, fatigue. VSS on arrival, patient in NAD.    Review of patient's allergies indicates:   Allergen Reactions    Iodine Nausea And Vomiting     Past Medical History:   Diagnosis Date    Diabetes mellitus     Hypertension      Past Surgical History:   Procedure Laterality Date    APPENDECTOMY       Family History   Problem Relation Age of Onset    Heart failure Mother     Kidney failure Mother     Diabetes Mother      Social History     Tobacco Use    Smoking status: Every Day     Packs/day: 1.00     Types: Cigarettes    Smokeless tobacco: Former     Types: Chew    Tobacco comments:     Nicotine patches 19   Substance Use Topics    Alcohol use: Yes     Comment: occasionally    Drug use: Yes     Types: Marijuana     Comment: occassionally     Review of Systems   All other systems reviewed and are negative.    Physical Exam     Initial Vitals [05/06/23 1122]   BP Pulse Resp Temp SpO2   (!) 157/90 94 18 96.8 °F (36 °C) 99 %      MAP       --         Physical Exam    Nursing note and vitals reviewed.  Constitutional: He appears well-developed and well-nourished. He is not diaphoretic. No distress.   HENT:   Head: Normocephalic and atraumatic.   Mouth/Throat: Oropharynx is clear and moist. No oropharyngeal exudate.   Eyes: Conjunctivae and EOM are normal.  Pupils are equal, round, and reactive to light. No scleral icterus.   Neck: Neck supple. No JVD present.   Normal range of motion.  Cardiovascular:  Normal rate, regular rhythm, normal heart sounds and intact distal pulses.     Exam reveals no gallop and no friction rub.       No murmur heard.  Pulmonary/Chest: Breath sounds normal. No respiratory distress. He has no wheezes. He has no rhonchi. He has no rales.   Abdominal: Abdomen is soft. Bowel sounds are normal. He exhibits no distension, no fluid wave, no abdominal bruit and no pulsatile midline mass. There is abdominal tenderness (moderate) in the left lower quadrant. No hernia.   No right CVA tenderness.  No left CVA tenderness. There is no rebound, no guarding, no tenderness at McBurney's point and negative Reyes's sign. negative Rovsing's sign  Musculoskeletal:         General: Normal range of motion.      Cervical back: Normal range of motion and neck supple.     Neurological: He is alert and oriented to person, place, and time. He has normal strength. No cranial nerve deficit or sensory deficit.   Skin: Skin is warm and dry. Capillary refill takes less than 2 seconds. No rash noted. No erythema. No pallor.   Psychiatric: He has a normal mood and affect.       ED Course   Procedures  Labs Reviewed   COMPREHENSIVE METABOLIC PANEL - Abnormal; Notable for the following components:       Result Value    Blood Urea Nitrogen 8.0 (*)     All other components within normal limits   URINALYSIS, REFLEX TO URINE CULTURE - Abnormal; Notable for the following components:    Protein, UA Trace (*)     Ketones, UA Trace (*)     Mucous, UA Few (*)     All other components within normal limits   CBC WITH DIFFERENTIAL - Abnormal; Notable for the following components:    MPV 10.8 (*)     All other components within normal limits   LIPASE - Normal   MAGNESIUM - Normal   TROPONIN I - Normal   CBC W/ AUTO DIFFERENTIAL    Narrative:     The following orders were created for panel  order CBC Auto Differential.  Procedure                               Abnormality         Status                     ---------                               -----------         ------                     CBC with Differential[[id number]]        Abnormal            Final result                 Please view results for these tests on the individual orders.     EKG Readings: (Independently Interpreted)   Rhythm: Normal Sinus Rhythm. Heart Rate: 64. Ectopy: No Ectopy. Conduction: Normal. ST Segments: Normal ST Segments. Axis: Normal. Clinical Impression: Normal Sinus Rhythm   ECG Results              EKG 12-lead (In process)  Result time 05/06/23 13:12:46      In process by Interface, Lab In [facility] (05/06/23 13:12:46)                   Narrative:    Test Reason : R10.9,    Vent. Rate : 064 BPM     Atrial Rate : 064 BPM     P-R Int : 200 ms          QRS Dur : 088 ms      QT Int : 360 ms       P-R-T Axes : 046 029 061 degrees     QTc Int : 371 ms    Normal sinus rhythm  Cannot rule out Anterior infarct ,age undetermined  Abnormal ECG  No previous ECGs available    Referred By: [email or web address]   SELF           Confirmed By:                                   Imaging Results              CT Abdomen Pelvis With Contrast (Final result)  Result time 05/06/23 15:07:39      Final result by Dennis Ball MD (05/06/23 15:07:39)                   Impression:      Distal descending and proximal sigmoid colon acute diverticulitis.  Follow-up exams are recommended to ensure associated considerable concentric mural thickening resolves and there is no other underlying etiology.  No abscess, pneumoperitoneum or bowel obstruction.      Electronically signed by: Dennis Ball  Date:    05/06/2023  Time:    15:07               Narrative:    EXAMINATION:  CT ABDOMEN PELVIS WITH CONTRAST    CLINICAL HISTORY:  LLQ abdominal pain;    TECHNIQUE:  Multidetector axial images were obtained of the abdomen and pelvis following the administration of IV  contrast. Oral contrast was not administered.    Dose length product of 1421 mGycm. Automated exposure control was utilized to minimize radiation dose.    COMPARISON:  None available    FINDINGS:  Included portion of the lungs are without suspicious nodularity, acute air space infiltrates or fluid within the pleural spaces.    Hepatic volume and attenuation is unremarkable. Gallbladder wall is not thickened and there is no intra luminal calcified calculus. No biliary dilation identified. Pancreatic unremarkable attenuation without acute peripancreatic phlegmons. Main pancreatic duct is not dilated. Spleen is of normal size without focal lesion.    The adrenal glands appear within normal limits. The kidneys are unremarkable in size and contour. No solid or cystic renal lesion identified. There is no hydronephrosis. No perinephric fluid strandings or collections identified.    Stomach is decompressed accentuating wall thickness.  There probably is small hiatal hernia.  There is no abnormal dilatation of loops of small bowel and there is no focal or generalized mural thickening.  Appendix is not visualized with probable prior appendectomy.  There is mural thickening of distal descending and proximal sigmoid colon with associated diverticulosis coli.  There are pericolonic inflammatory standings.  Findings are consistent with acute diverticulitis.  There is no bowel obstruction, abscess or pneumoperitoneum..    Urinary bladder appears within normal limits. There is no pelvic free fluid.    Grade 1 retrolisthesis of L5 on S1 and degenerative changes.  No acute or otherwise osseous abnormality identified.                                       X-Ray Abdomen Flat And Erect (Final result)  Result time 05/06/23 13:18:58      Final result by Royal Alford MD (05/06/23 13:18:58)                   Impression:      No acute findings.  Moderate stool in the colon.      Electronically signed by: Royal  Alford  Date:    05/06/2023  Time:    13:18               Narrative:    EXAMINATION:  XR ABDOMEN FLAT AND ERECT    CLINICAL HISTORY:  abdominal pain;    COMPARISON:  17 July 2012    FINDINGS:  Flat and upright views of the abdomen demonstrate a nonspecific, nonobstructive bowel gas pattern.  There is moderate stool in the colon.  No free air.                                       Medications   diphenhydrAMINE injection 12.5 mg (12.5 mg Intravenous Given 5/6/23 1408)   iohexoL (OMNIPAQUE 350) 350 mg iodine/mL injection (  Given 5/6/23 1430)     Medical Decision Making:   Clinical Tests:   Lab Tests: Ordered and Reviewed  Radiological Study: Ordered and Reviewed  Medical Tests: Ordered and Reviewed  Today's CT scan reveals diverticulitis, but workup otherwise largely unremarkable. Benign abdominal exam w/o rebound, guarding, rigidity or distension. CBC, CMP, mag, lipase all WNL. No evidence for stone or UTI based off of UA results. EKG non-ischemic, troponin WNL, low suspicion for ACS as source of abdominal pain. Patient is non-toxic appearing w/ normal vital signs, stable for discharge. Will prescribe augmentin BID x 10 days, along w/ small amount of norco for pain control. Recommended clear liquid diet for next 1-2 days, followed by 2-3 days of bland diet. Instructed to follow-up w/ PCP. Referral placed to GI clinic. ED precautions given for new or worsening symptoms.                        Clinical Impression:   Final diagnoses:  [R10.9] Abdominal pain  [K57.92] Diverticulitis (Primary)        ED Disposition Condition    Discharge Good          ED Prescriptions       Medication Sig Dispense Start Date End Date Auth. Provider    amoxicillin-clavulanate 875-125mg (AUGMENTIN) 875-125 mg per tablet Take 1 tablet by mouth 2 (two) times daily. for 10 days 20 tablet 5/6/2023 5/16/2023 EMORY Carranza    HYDROcodone-acetaminophen (NORCO) 5-325 mg per tablet Take 1 tablet by mouth every 6 (six) hours as needed for Pain.  8 tablet 5/6/2023 5/11/2023 EMORY Carranza          Follow-up Information       Follow up With Specialties Details Why Contact Info Additional Information    Galina Paulson NP Nurse Practitioner Schedule an appointment as soon as possible for a visit   [address] [facility] [address]  [phone]       [facility] Gastroenterology In 2 weeks  [address] [facility] [id number] [phone] Entrance 1    [facility] Emergency Medicine  As needed, If symptoms worsen [address] [facility] [id number] [phone]              EMORY Carranza  05/06/23 1536       EMORY Carranza  05/06/23 1537

## 2023-05-06 NOTE — DISCHARGE INSTRUCTIONS
Report to Emergency Department if symptoms return or worsen; [facility] Within 1 to 2 days, It is important that you follow up with your primary care provider or specialist if indicated for further evaluation, workup, and treatment as necessary. The exam and treatment you received in Emergency Department was for an urgent problem and NOT INTENDED AS COMPLETE CARE. It is important that you FOLLOW UP with a doctor for ongoing care. If your symptoms become WORSE or you DO NOT IMPROVE and you are unable to reach your health care provider, you should RETURN to the Emergency Department. The Emergency Department provider has provided a PRELIMINARY INTERPRETATION of all your studies. A final interpretation may be done after you are discharged. If a change in your diagnosis or treatment is needed WE WILL CONTACT YOU. It is critical that we have a CURRENT PHONE NUMBER FOR YOU.

## 2023-05-08 ENCOUNTER — TELEPHONE (OUTPATIENT)
Dept: INTERNAL MEDICINE | Facility: CLINIC | Age: 43
End: 2023-05-08
Payer: MEDICAID

## 2023-05-08 NOTE — TELEPHONE ENCOUNTER
Please contact patient to schedule ER follow-up visit in 2 weeks.  Advise if symptoms should worsen, patient to report back to ER.

## 2023-05-12 ENCOUNTER — OFFICE VISIT (OUTPATIENT)
Dept: BEHAVIORAL HEALTH | Facility: CLINIC | Age: 43
End: 2023-05-12
Payer: MEDICAID

## 2023-05-12 VITALS
WEIGHT: 225.38 LBS | SYSTOLIC BLOOD PRESSURE: 147 MMHG | DIASTOLIC BLOOD PRESSURE: 79 MMHG | HEART RATE: 80 BPM | TEMPERATURE: 98 F | BODY MASS INDEX: 30.57 KG/M2

## 2023-05-12 DIAGNOSIS — F32.A DEPRESSION, UNSPECIFIED DEPRESSION TYPE: Primary | ICD-10-CM

## 2023-05-12 DIAGNOSIS — F41.9 ANXIETY: ICD-10-CM

## 2023-05-12 PROCEDURE — 99213 OFFICE O/P EST LOW 20 MIN: CPT | Mod: PBBFAC,PN | Performed by: STUDENT IN AN ORGANIZED HEALTH CARE EDUCATION/TRAINING PROGRAM

## 2023-05-12 PROCEDURE — 99203 OFFICE O/P NEW LOW 30 MIN: CPT | Mod: AF,HB,S$PBB, | Performed by: STUDENT IN AN ORGANIZED HEALTH CARE EDUCATION/TRAINING PROGRAM

## 2023-05-12 PROCEDURE — 99203 PR OFFICE/OUTPT VISIT, NEW, LEVL III, 30-44 MIN: ICD-10-PCS | Mod: AF,HB,S$PBB, | Performed by: STUDENT IN AN ORGANIZED HEALTH CARE EDUCATION/TRAINING PROGRAM

## 2023-05-12 PROCEDURE — 1159F MED LIST DOCD IN RCRD: CPT | Mod: CPTII,,, | Performed by: STUDENT IN AN ORGANIZED HEALTH CARE EDUCATION/TRAINING PROGRAM

## 2023-05-12 PROCEDURE — 3008F BODY MASS INDEX DOCD: CPT | Mod: CPTII,,, | Performed by: STUDENT IN AN ORGANIZED HEALTH CARE EDUCATION/TRAINING PROGRAM

## 2023-05-12 PROCEDURE — 3077F PR MOST RECENT SYSTOLIC BLOOD PRESSURE >= 140 MM HG: ICD-10-PCS | Mod: CPTII,,, | Performed by: STUDENT IN AN ORGANIZED HEALTH CARE EDUCATION/TRAINING PROGRAM

## 2023-05-12 PROCEDURE — 3077F SYST BP >= 140 MM HG: CPT | Mod: CPTII,,, | Performed by: STUDENT IN AN ORGANIZED HEALTH CARE EDUCATION/TRAINING PROGRAM

## 2023-05-12 PROCEDURE — 3078F DIAST BP <80 MM HG: CPT | Mod: CPTII,,, | Performed by: STUDENT IN AN ORGANIZED HEALTH CARE EDUCATION/TRAINING PROGRAM

## 2023-05-12 PROCEDURE — 1159F PR MEDICATION LIST DOCUMENTED IN MEDICAL RECORD: ICD-10-PCS | Mod: CPTII,,, | Performed by: STUDENT IN AN ORGANIZED HEALTH CARE EDUCATION/TRAINING PROGRAM

## 2023-05-12 PROCEDURE — 1160F PR REVIEW ALL MEDS BY PRESCRIBER/CLIN PHARMACIST DOCUMENTED: ICD-10-PCS | Mod: CPTII,,, | Performed by: STUDENT IN AN ORGANIZED HEALTH CARE EDUCATION/TRAINING PROGRAM

## 2023-05-12 PROCEDURE — 1160F RVW MEDS BY RX/DR IN RCRD: CPT | Mod: CPTII,,, | Performed by: STUDENT IN AN ORGANIZED HEALTH CARE EDUCATION/TRAINING PROGRAM

## 2023-05-12 PROCEDURE — 3078F PR MOST RECENT DIASTOLIC BLOOD PRESSURE < 80 MM HG: ICD-10-PCS | Mod: CPTII,,, | Performed by: STUDENT IN AN ORGANIZED HEALTH CARE EDUCATION/TRAINING PROGRAM

## 2023-05-12 PROCEDURE — 3008F PR BODY MASS INDEX (BMI) DOCUMENTED: ICD-10-PCS | Mod: CPTII,,, | Performed by: STUDENT IN AN ORGANIZED HEALTH CARE EDUCATION/TRAINING PROGRAM
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
## 2023-06-01 ENCOUNTER — OFFICE VISIT (OUTPATIENT)
Dept: INTERNAL MEDICINE | Facility: CLINIC | Age: 43
End: 2023-06-01
Payer: MEDICAID

## 2023-06-01 ENCOUNTER — TELEPHONE (OUTPATIENT)
Dept: INTERNAL MEDICINE | Facility: CLINIC | Age: 43
End: 2023-06-01

## 2023-06-01 VITALS
DIASTOLIC BLOOD PRESSURE: 79 MMHG | HEART RATE: 63 BPM | BODY MASS INDEX: 30.31 KG/M2 | TEMPERATURE: 98 F | RESPIRATION RATE: 20 BRPM | WEIGHT: 223.81 LBS | HEIGHT: 72 IN | SYSTOLIC BLOOD PRESSURE: 131 MMHG

## 2023-06-01 DIAGNOSIS — F17.219 CIGARETTE NICOTINE DEPENDENCE WITH NICOTINE-INDUCED DISORDER: ICD-10-CM

## 2023-06-01 DIAGNOSIS — F41.9 ANXIETY: ICD-10-CM

## 2023-06-01 DIAGNOSIS — F32.A DEPRESSION, UNSPECIFIED DEPRESSION TYPE: ICD-10-CM

## 2023-06-01 DIAGNOSIS — K59.09 OTHER CONSTIPATION: ICD-10-CM

## 2023-06-01 DIAGNOSIS — K57.32 DIVERTICULITIS OF SIGMOID COLON: Primary | ICD-10-CM

## 2023-06-01 PROCEDURE — 1160F PR REVIEW ALL MEDS BY PRESCRIBER/CLIN PHARMACIST DOCUMENTED: ICD-10-PCS | Mod: CPTII,,, | Performed by: NURSE PRACTITIONER

## 2023-06-01 PROCEDURE — 3078F DIAST BP <80 MM HG: CPT | Mod: CPTII,,, | Performed by: NURSE PRACTITIONER

## 2023-06-01 PROCEDURE — 99214 OFFICE O/P EST MOD 30 MIN: CPT | Mod: PBBFAC | Performed by: NURSE PRACTITIONER

## 2023-06-01 PROCEDURE — 3008F PR BODY MASS INDEX (BMI) DOCUMENTED: ICD-10-PCS | Mod: CPTII,,, | Performed by: NURSE PRACTITIONER

## 2023-06-01 PROCEDURE — 1160F RVW MEDS BY RX/DR IN RCRD: CPT | Mod: CPTII,,, | Performed by: NURSE PRACTITIONER

## 2023-06-01 PROCEDURE — 3075F SYST BP GE 130 - 139MM HG: CPT | Mod: CPTII,,, | Performed by: NURSE PRACTITIONER

## 2023-06-01 PROCEDURE — 99406 BEHAV CHNG SMOKING 3-10 MIN: CPT | Mod: S$PBB,,, | Performed by: NURSE PRACTITIONER

## 2023-06-01 PROCEDURE — 3075F PR MOST RECENT SYSTOLIC BLOOD PRESS GE 130-139MM HG: ICD-10-PCS | Mod: CPTII,,, | Performed by: NURSE PRACTITIONER

## 2023-06-01 PROCEDURE — 99406 PR TOBACCO USE CESSATION INTERMEDIATE 3-10 MINUTES: ICD-10-PCS | Mod: S$PBB,,, | Performed by: NURSE PRACTITIONER

## 2023-06-01 PROCEDURE — 1159F MED LIST DOCD IN RCRD: CPT | Mod: CPTII,,, | Performed by: NURSE PRACTITIONER

## 2023-06-01 PROCEDURE — 3078F PR MOST RECENT DIASTOLIC BLOOD PRESSURE < 80 MM HG: ICD-10-PCS | Mod: CPTII,,, | Performed by: NURSE PRACTITIONER

## 2023-06-01 PROCEDURE — 99214 OFFICE O/P EST MOD 30 MIN: CPT | Mod: S$PBB,25,, | Performed by: NURSE PRACTITIONER

## 2023-06-01 PROCEDURE — 1159F PR MEDICATION LIST DOCUMENTED IN MEDICAL RECORD: ICD-10-PCS | Mod: CPTII,,, | Performed by: NURSE PRACTITIONER

## 2023-06-01 PROCEDURE — 3008F BODY MASS INDEX DOCD: CPT | Mod: CPTII,,, | Performed by: NURSE PRACTITIONER

## 2023-06-01 PROCEDURE — 99214 PR OFFICE/OUTPT VISIT, EST, LEVL IV, 30-39 MIN: ICD-10-PCS | Mod: S$PBB,25,, | Performed by: NURSE PRACTITIONER

## 2023-06-01 NOTE — PROGRESS NOTES
"  Galina Paulson NP   [facility]  [address] [address]      PATIENT NAME: Domingo Matias  : 1980  DATE: 23  MRN: [id number]        Reason for Visit / Chief Complaint: Follow-up (ED visit -abdominal pain)       History of Present Illness / Problem Focused Workflow     Domingo Matias presents to the clinic with Follow-up (ED visit -abdominal pain)     41 yo AAF for ER follow up from 23. He was treated for diverticulitis diagnosed by CT abd/pelvis. Discharged with 10 day course of Augmentin. He continues with constipation though abdominal pain has improved. Using OTC stool softener and Mg citrate with relief. Wt loss 13 # since 2023. Overall wt loss of 33# since 2022. He states he was trying to eliminate certain foods from diet. He does not want to be on medications for BP/ DM, etc. Trying to be healthy. He states he completely stopped marijuana. He continues to smoke cigarettes but is weaning significant amount. He had initial visit with Dr. Boone however he states he is not interested in medication. He will work on it on his own. He is going back to [employer] and praying a lot and feels "humbled." Otherwise, denies any other concerns/ complaints.    PMH includes HTN, CP, diverticulosis, anxiety, obesity, tobacco abuse.     Other providers  Cardiologist- [facility]?   [facility]  Dr. Boone       Review of Systems     Review of Systems   Constitutional:  Negative for appetite change, chills, fatigue, fever and unexpected weight change.   HENT:  Negative for congestion, ear pain, hearing loss, sinus pressure, sore throat and tinnitus.    Respiratory:  Negative for cough, chest tightness, shortness of breath and wheezing.    Cardiovascular:  Negative for chest pain, palpitations and leg swelling.   Gastrointestinal:  Positive for constipation. Negative for abdominal distention, abdominal pain, blood in stool, diarrhea, " nausea and vomiting.   Genitourinary:  Negative for dysuria and hematuria.   Musculoskeletal:  Negative for arthralgias and myalgias.   Skin:  Negative for pallor.   Allergic/Immunologic: Negative for environmental allergies.   Neurological:  Negative for dizziness, syncope, weakness, numbness and headaches.   Hematological:  Negative for adenopathy. Does not bruise/bleed easily.   Psychiatric/Behavioral:  Negative for agitation, behavioral problems, confusion, hallucinations, sleep disturbance and suicidal ideas. The patient is not nervous/anxious.      Medical / Social / Family History     ----------------------------  Diabetes mellitus  Hypertension     Past Surgical History:   Procedure Laterality Date    APPENDECTOMY         Social History     Socioeconomic History    Marital status:    Occupational History    Occupation: unemployed   Tobacco Use    Smoking status: Every Day     Packs/day: 0.50     Types: Cigarettes    Smokeless tobacco: Former     Types: Chew   Substance and Sexual Activity    Alcohol use: Yes     Comment: occasionally    Drug use: Yes     Types: Marijuana     Comment: occassionally    Sexual activity: Yes     Partners: Female     Social Determinants of Health     Physical Activity: Inactive    Days of Exercise per Week: 0 days    Minutes of Exercise per Session: 0 min        Family History   Problem Relation Age of Onset    Heart failure Mother     Kidney failure Mother     Diabetes Mother         Medications and Allergies     Medications  Current Outpatient Medications   Medication Instructions    aspirin (ECOTRIN) 81 mg, Oral, Daily    diltiaZEM (CARDIZEM CD) 180 mg, Oral, Nightly       Allergies  Review of patient's allergies indicates:   Allergen Reactions    Iodine Nausea And Vomiting       Physical Examination     Visit Vitals  /79 (BP Location: Left arm, Patient Position: Sitting, BP Method: X-Large (Automatic))   Pulse 63   Temp 98.2 °F (36.8 °C) (Oral)   Resp 20   Ht 6'  (1.829 m)   Wt 101.5 kg (223 lb 12.8 oz)   BMI 30.35 kg/m²       Physical Exam  Vitals and nursing note reviewed.   Constitutional:       Appearance: Normal appearance. He is not ill-appearing.   HENT:      Head: Normocephalic.   Cardiovascular:      Rate and Rhythm: Normal rate and regular rhythm.      Pulses: Normal pulses.   Pulmonary:      Effort: Pulmonary effort is normal. No respiratory distress.      Breath sounds: Normal breath sounds.   Abdominal:      General: Bowel sounds are normal. There is no distension.      Palpations: Abdomen is soft. There is no mass.      Tenderness: There is no abdominal tenderness. There is no guarding or rebound.      Hernia: No hernia is present.   Musculoskeletal:         General: Normal range of motion.      Cervical back: Normal range of motion and neck supple.      Right lower leg: No edema.      Left lower leg: No edema.   Skin:     General: Skin is warm and dry.   Neurological:      Mental Status: He is alert and oriented to person, place, and time. Mental status is at baseline.      Motor: No weakness.   Psychiatric:         Mood and Affect: Mood normal.         Behavior: Behavior normal.         Thought Content: Thought content normal.         Judgment: Judgment normal.         Results     Lab Results   Component Value Date    WBC 8.39 05/06/2023    RBC 5.01 05/06/2023    HGB 14.5 05/06/2023    HCT 43.8 05/06/2023    MCV 87.4 05/06/2023    MCH 28.9 05/06/2023    MCHC 33.1 05/06/2023    RDW 13.4 05/06/2023     05/06/2023    MPV 10.8 (H) 05/06/2023     Sodium Level   Date Value Ref Range Status   05/06/2023 137 136 - 145 mmol/L Final     Potassium Level   Date Value Ref Range Status   05/06/2023 4.4 3.5 - 5.1 mmol/L Final     Carbon Dioxide   Date Value Ref Range Status   05/06/2023 23 22 - 29 mmol/L Final     Blood Urea Nitrogen   Date Value Ref Range Status   05/06/2023 8.0 (L) 8.9 - 20.6 mg/dL Final     Creatinine   Date Value Ref Range Status   05/06/2023  1.04 0.73 - 1.18 mg/dL Final     Calcium Level Total   Date Value Ref Range Status   05/06/2023 9.8 8.4 - 10.2 mg/dL Final     Albumin Level   Date Value Ref Range Status   05/06/2023 4.0 3.5 - 5.0 g/dL Final     Bilirubin Total   Date Value Ref Range Status   05/06/2023 0.9 <=1.5 mg/dL Final     Alkaline Phosphatase   Date Value Ref Range Status   05/06/2023 52 40 - 150 unit/L Final     Aspartate Aminotransferase   Date Value Ref Range Status   05/06/2023 17 5 - 34 unit/L Final     Alanine Aminotransferase   Date Value Ref Range Status   05/06/2023 19 0 - 55 unit/L Final     Lab Results   Component Value Date    CHOL 152 11/08/2022     Lab Results   Component Value Date    HDL 48 11/08/2022     No results found for: LDLCALC  Lab Results   Component Value Date    TRIG 60 11/08/2022     No results found for: CHOLHDL  Lab Results   Component Value Date    TSH 0.7432 11/08/2022     Lab Results   Component Value Date    PHUR 6.0 02/27/2022    PROTEINUA Trace (A) 05/06/2023    GLUCUA Negative 02/27/2022    KETONESU Negative 02/27/2022    OCCULTUA Negative 02/27/2022    NITRITE Negative 02/27/2022    LEUKOCYTESUR Negative 05/06/2023     Lab Results   Component Value Date    HGBA1C 5.7 11/08/2022     No results found for: MICROALBUR, [id number]   No results found for this or any previous visit.         Assessment       ICD-10-CM ICD-9-CM   1. Diverticulitis of sigmoid colon  K57.32 562.11   2. Anxiety  F41.9 300.00   3. Depression, unspecified depression type  F32.A 311   4. Other constipation  K59.09 564.09   5. BMI 30.0-30.9,adult  Z68.30 V85.30   6. Cigarette nicotine dependence with nicotine-induced disorder  F17.219 292.9       Plan       1. Diverticulitis of sigmoid colon  Refer to ER notes 5/6/23 with CT abd/pelvis findings- symptoms improved per pt except continues with constipation  Initial episode November 2022  Notes reviewed  Evaluated by [facility] 2/2023 with colonoscopy scheduled 9/2023  Educated on  diverticulosis, avoid NSAIDs, encouraged regular BM, avoid constipation, plenty of water/ high fiber diet and exercise encouraged     2. Anxiety  Evaluated by Dr. Boone however he is not interested in medication. Working on this on his own. Praying. Went back to [facility]. Doing well he states. Notify provider for any worsening s/s or any SI/HI- report to ER/ call 911.    3. Depression, unspecified depression type  Evaluated by Dr. Boone however he is not interested in medication. Working on this on his own. Praying. Went back to [facility]. Doing well he states. Notify provider for any worsening s/s or any SI/HI- report to ER/ call 911.    4. Other constipation  Educated on slowly increasing fiber in diet to include fresh fruits and vegetables (squash, cabbage, lettuce, other greens, beans, and peas), whole grains and oats, nuts, and importance of adequate water intake (6-8 glasses of fluids daily).  Educated on health benefits of at least 5 days/ week of 30 minutes moderate intensity exercise (brisk walking) and 2 or more days/ week of muscle strength activities.  Daily Miralax with 6-8 oz water/juice or Metamucil with otc stool softener  If no improvement, notify provider or GI provider       5. BMI 30.0-30.9,adult  BMI 30.35, wt loss 33# since November 2022; 13 # since April 2023- trying to lose weight   Educated on increased risk of disease s/t obesity.  Educated on health benefits of at least 5 days/ week of 30 minutes moderate intensity exercise (brisk walking) and 2 or more days/ week of muscle strength activities (as tolerated).  Eat well balanced diet of fresh fruits/ vegetables, whole grains, lean meats and limit high carbohydrate foods.       6. Cigarette nicotine dependence with nicotine-induced disorder  Less than 0.5 ppd- trying to wean   Discussed for at least 3 minutes importance of smoking cessation and health benefits, including adverse effects to patient's health and organs.  Encouraged cessation.  Stopped cessation program because nicotine patches were more expensive than cigarettes; doing it on his own he states.        Future Appointments   Date Time Provider Department Center   11/13/2023  9:00 AM Galina Paulson NP [facility]        Follow up if symptoms worsen or fail to improve.    Signature:  Galina Paulson NP  [facility]  [address] [facility] [address]    Date of encounter: 6/1/23

## 2023-06-01 NOTE — TELEPHONE ENCOUNTER
Pt called and stated that he did not want to be taken off his blood pressure medicine, he still needs it. He meant that he will tell lose weight to eventually not need the blood pressure medication. Please advise

## 2023-06-05 ENCOUNTER — CLINICAL SUPPORT (OUTPATIENT)
Dept: SMOKING CESSATION | Facility: CLINIC | Age: 43
End: 2023-06-05

## 2023-06-05 DIAGNOSIS — F17.200 NICOTINE DEPENDENCE: Primary | ICD-10-CM

## 2023-06-05 PROCEDURE — 99407 PR TOBACCO USE CESSATION INTENSIVE >10 MINUTES: ICD-10-PCS | Mod: S$GLB,,,

## 2023-06-05 PROCEDURE — 99407 BEHAV CHNG SMOKING > 10 MIN: CPT | Mod: S$GLB,,,

## 2023-06-05 NOTE — PROGRESS NOTES
Spoke with patient today in regard to smoking cessation progress for 6 month telephone follow up, he states not tobacco free. Patient states he has reduced to 3-4 cpd and not interested in returning to the program at this time.  We discussed coupons for NRT.  Informed patient of benefit period, future follow up, and contact information if any further help or support is needed.  Will complete smart form for 6 month follow up on Quit attempt #1.

## 2023-06-22 ENCOUNTER — HOSPITAL ENCOUNTER (EMERGENCY)
Facility: HOSPITAL | Age: 43
Discharge: HOME | End: 2023-06-22
Attending: INTERNAL MEDICINE
Payer: MEDICAID

## 2023-06-22 VITALS
SYSTOLIC BLOOD PRESSURE: 128 MMHG | WEIGHT: 240 LBS | HEART RATE: 85 BPM | RESPIRATION RATE: 18 BRPM | DIASTOLIC BLOOD PRESSURE: 76 MMHG | HEIGHT: 72 IN | BODY MASS INDEX: 32.51 KG/M2 | OXYGEN SATURATION: 97 % | TEMPERATURE: 98 F

## 2023-06-22 DIAGNOSIS — L02.214 ABSCESS OF GROIN, RIGHT: ICD-10-CM

## 2023-06-22 DIAGNOSIS — N49.2 ABSCESS OF SCROTAL WALL: Primary | ICD-10-CM

## 2023-06-22 PROCEDURE — 10060 I&D ABSCESS SIMPLE/SINGLE: CPT

## 2023-06-22 PROCEDURE — 99284 EMERGENCY DEPT VISIT MOD MDM: CPT | Mod: 25

## 2023-06-22 PROCEDURE — 90471 IMMUNIZATION ADMIN: CPT | Performed by: INTERNAL MEDICINE

## 2023-06-22 PROCEDURE — 87070 CULTURE OTHR SPECIMN AEROBIC: CPT | Performed by: INTERNAL MEDICINE

## 2023-06-22 PROCEDURE — 63600175 PHARM REV CODE 636 W HCPCS: Performed by: INTERNAL MEDICINE

## 2023-06-22 PROCEDURE — 25000003 PHARM REV CODE 250: Performed by: INTERNAL MEDICINE

## 2023-06-22 PROCEDURE — 90715 TDAP VACCINE 7 YRS/> IM: CPT | Performed by: INTERNAL MEDICINE

## 2023-06-22 RX ORDER — ONDANSETRON 4 MG/1
4 TABLET, ORALLY DISINTEGRATING ORAL ONCE
Status: COMPLETED | OUTPATIENT
Start: 2023-06-22 | End: 2023-06-22

## 2023-06-22 RX ORDER — OXYCODONE AND ACETAMINOPHEN 10; 325 MG/1; MG/1
1 TABLET ORAL ONCE
Status: COMPLETED | OUTPATIENT
Start: 2023-06-22 | End: 2023-06-22

## 2023-06-22 RX ORDER — HYDROCODONE BITARTRATE AND ACETAMINOPHEN 10; 325 MG/1; MG/1
1 TABLET ORAL EVERY 6 HOURS PRN
Qty: 15 TABLET | Refills: 0 | Status: SHIPPED | OUTPATIENT
Start: 2023-06-22

## 2023-06-22 RX ORDER — SULFAMETHOXAZOLE AND TRIMETHOPRIM 800; 160 MG/1; MG/1
1 TABLET ORAL 2 TIMES DAILY
Qty: 20 TABLET | Refills: 0 | Status: SHIPPED | OUTPATIENT
Start: 2023-06-22 | End: 2023-07-02

## 2023-06-22 RX ADMIN — OXYCODONE HYDROCHLORIDE AND ACETAMINOPHEN 1 TABLET: 10; 325 TABLET ORAL at 04:06

## 2023-06-22 RX ADMIN — ONDANSETRON 4 MG: 4 TABLET, ORALLY DISINTEGRATING ORAL at 04:06

## 2023-06-22 RX ADMIN — TETANUS TOXOID, REDUCED DIPHTHERIA TOXOID AND ACELLULAR PERTUSSIS VACCINE, ADSORBED 0.5 ML: 5; 2.5; 8; 8; 2.5 SUSPENSION INTRAMUSCULAR at 04:06

## 2023-06-22 NOTE — ED PROVIDER NOTES
Source of History:  Patient, no limitations    Chief complaint:  Abscess and Numbness      HPI:  Domingo Matias is a 42 y.o. male presenting with Abscess and Numbness         Abscess: Patient presents for evaluation of a cutaneous abscess. Lesion is located in the right groin with involvement of scrotum. Onset was a few days ago. Symptoms have  rapidly worsened . Abscess has associated symptoms of pain. Patient does not have previous history of cutaneous abscesses. Patient does not have diabetes.        Review of Systems   Constitutional symptoms:  Negative except as documented in HPI.   Skin symptoms:  Negative except as documented in HPI.   HEENT symptoms:  Negative except as documented in HPI.   Respiratory symptoms:  Negative except as documented in HPI.   Cardiovascular symptoms:  Negative except as documented in HPI.   Gastrointestinal symptoms:  Negative except as documented in HPI.    Genitourinary symptoms:  Negative except as documented in HPI.   Musculoskeletal symptoms:  Negative except as documented in HPI.   Neurologic symptoms:  Negative except as documented in HPI.   Psychiatric symptoms:  Negative except as documented in HPI.   Allergy/immunologic symptoms:  Negative except as documented in HPI.             Additional review of systems information: All other systems reviewed and otherwise negative.      Review of patient's allergies indicates:   Allergen Reactions    Iodine Nausea And Vomiting       PMH:  As per HPI and below:    Past Medical History:   Diagnosis Date    Diabetes mellitus     Hypertension         Family History   Problem Relation Age of Onset    Heart failure Mother     Kidney failure Mother     Diabetes Mother        Past Surgical History:   Procedure Laterality Date    APPENDECTOMY         Social History     Tobacco Use    Smoking status: Every Day     Packs/day: 0.50     Types: Cigarettes    Smokeless tobacco: Former     Types: Chew   Substance Use Topics    Alcohol use: Yes      Comment: occasionally    Drug use: Yes     Types: Marijuana     Comment: occassionally       Patient Active Problem List   Diagnosis    Hypertension    Obesity    Depression, unspecified    Lumbar degenerative disc disease    Left hydrocele    Diverticulitis    Cigarette nicotine dependence with nicotine-induced disorder    Tinnitus of both ears    Other chest pain    History of bloody stools    IGT (impaired glucose tolerance)    Parosmia    Hyposmia    Bilateral varicoceles    Diverticulitis of sigmoid colon    Hematochezia    Anxiety    Other constipation    BMI 30.0-30.9,adult        Physical Exam:    /76 (BP Location: Left arm, Patient Position: Sitting)   Pulse 85   Temp 98 °F (36.7 °C) (Oral)   Resp 18   Ht 6' (1.829 m)   Wt 108.9 kg (240 lb)   SpO2 97%   BMI 32.55 kg/m²     Nursing note and vital signs reviewed.    General:  Alert, no acute distress.   Skin: Normal for Ethnic Origin, No cyanosis, abscess right groin with scrotal wall involvement  HEENT: Normocephalic and atraumatic, Vision unchanged, Pupils symmetric, No icterus , Nasal mucosa is pink and moist  Cardiovascular:  Regular rate and rhythm, No edema  Chest Wall: No deformity, equal chest rise  Respiratory:  Lungs are clear to auscultation, respirations are non-labored.    Musculoskeletal:  No deformity, Normal perfusion to all extremities  Gastrointestinal:  Soft, Non distended  Neurological:  Alert and oriented, normal motor observed, normal speech observed.    Psychiatric:  Cooperative, appropriate mood & affect.        Labs that have been ordered have been independently reviewed and interpreted by myself.     Old Chart Reviewed.      Initial Impression/ Differential Dx:  Cellulitis, abscess, necrotizing fasciitis, MRSA, local trauma/contusion      MDM:      Reviewed Nurses Note.    Reviewed Pertinent old records.    Orders Placed This Encounter    INCISION AND DRAINAGE    Wound Culture    Ambulatory referral/consult to  Urology    ED I&D Kit    Tdap (BOOSTRIX) vaccine injection 0.5 mL    oxyCODONE-acetaminophen  mg per tablet 1 tablet    ondansetron disintegrating tablet 4 mg    sulfamethoxazole-trimethoprim 800-160mg (BACTRIM DS) 800-160 mg Tab    HYDROcodone-acetaminophen (NORCO)  mg per tablet                    Labs Reviewed   WOUND CULTURE ([facility])          No orders to display        No visits with results within 1 Day(s) from this visit.   Latest known visit with results is:   Admission on 05/06/2023, Discharged on 05/06/2023   Component Date Value Ref Range Status    Sodium Level 05/06/2023 137  136 - 145 mmol/L Final    Potassium Level 05/06/2023 4.4  3.5 - 5.1 mmol/L Final    Chloride 05/06/2023 105  98 - 107 mmol/L Final    Carbon Dioxide 05/06/2023 23  22 - 29 mmol/L Final    Glucose Level 05/06/2023 93  74 - 100 mg/dL Final    Blood Urea Nitrogen 05/06/2023 8.0 (L)  8.9 - 20.6 mg/dL Final    Creatinine 05/06/2023 1.04  0.73 - 1.18 mg/dL Final    Calcium Level Total 05/06/2023 9.8  8.4 - 10.2 mg/dL Final    Protein Total 05/06/2023 7.1  6.4 - 8.3 gm/dL Final    Albumin Level 05/06/2023 4.0  3.5 - 5.0 g/dL Final    Globulin 05/06/2023 3.1  2.4 - 3.5 gm/dL Final    Albumin/Globulin Ratio 05/06/2023 1.3  1.1 - 2.0 ratio Final    Bilirubin Total 05/06/2023 0.9  <=1.5 mg/dL Final    Alkaline Phosphatase 05/06/2023 52  40 - 150 unit/L Final    Alanine Aminotransferase 05/06/2023 19  0 - 55 unit/L Final    Aspartate Aminotransferase 05/06/2023 17  5 - 34 unit/L Final    eGFR 05/06/2023 >60  mls/min/1.73/m2 Final    Lipase Level 05/06/2023 10  <=60 U/L Final    Color, UA 05/06/2023 Yellow  Yellow, Light-Yellow, Dark Yellow, Lindsey, Straw Final    Appearance, UA 05/06/2023 Clear  Clear Final    Specific Gravity, UA 05/06/2023 1.025   Final    pH, UA 05/06/2023 5.5  5.0 - 8.5 Final    Protein, UA 05/06/2023 Trace (A)  Negative mg/dL Final    Glucose, UA 05/06/2023 Normal  Negative, Normal mg/dL Final    Ketones, UA  05/06/2023 Trace (A)  Negative mg/dL Final    Blood, UA 05/06/2023 Negative  Negative unit/L Final    Bilirubin, UA 05/06/2023 Negative  Negative mg/dL Final    Urobilinogen, UA 05/06/2023 Normal  0.2, 1.0, Normal mg/dL Final    Nitrites, UA 05/06/2023 Negative  Negative Final    Leukocyte Esterase, UA 05/06/2023 Negative  Negative unit/L Final    WBC, UA 05/06/2023 0-5  None Seen, 0-2, 3-5, 0-5 /HPF Final    Bacteria, UA 05/06/2023 None Seen  None Seen /HPF Final    Squamous Epithelial Cells, UA 05/06/2023 None Seen  None Seen /HPF Final    Mucous, UA 05/06/2023 Few (A)  None Seen /LPF Final    Hyaline Casts, UA 05/06/2023 None Seen  None Seen /lpf Final    RBC, UA 05/06/2023 None Seen  None Seen, 0-2, 3-5, 0-5 /HPF Final    Magnesium Level 05/06/2023 2.00  1.60 - 2.60 mg/dL Final    Troponin-I 05/06/2023 <0.010  0.000 - 0.045 ng/mL Final    WBC 05/06/2023 8.39  4.50 - 11.50 x10(3)/mcL Final    RBC 05/06/2023 5.01  4.70 - 6.10 x10(6)/mcL Final    Hgb 05/06/2023 14.5  14.0 - 18.0 g/dL Final    Hct 05/06/2023 43.8  42.0 - 52.0 % Final    MCV 05/06/2023 87.4  80.0 - 94.0 fL Final    MCH 05/06/2023 28.9  27.0 - 31.0 pg Final    MCHC 05/06/2023 33.1  33.0 - 36.0 g/dL Final    RDW 05/06/2023 13.4  11.5 - 17.0 % Final    Platelet 05/06/2023 241  130 - 400 x10(3)/mcL Final    MPV 05/06/2023 10.8 (H)  7.4 - 10.4 fL Final    Neut % 05/06/2023 61.4  % Final    Lymph % 05/06/2023 26.9  % Final    Mono % 05/06/2023 10.3  % Final    Eos % 05/06/2023 0.6  % Final    Basophil % 05/06/2023 0.6  % Final    Lymph # 05/06/2023 2.26  0.6 - 4.6 x10(3)/mcL Final    Neut # 05/06/2023 5.15  2.1 - 9.2 x10(3)/mcL Final    Mono # 05/06/2023 0.86  0.1 - 1.3 x10(3)/mcL Final    Eos # 05/06/2023 0.05  0 - 0.9 x10(3)/mcL Final    Baso # 05/06/2023 0.05  <=0.2 x10(3)/mcL Final    IG# 05/06/2023 0.02  0 - 0.04 x10(3)/mcL Final    IG% 05/06/2023 0.2  % Final    NRBC% 05/06/2023 0.0  % Final       Imaging Results    None                                         I & D - Incision and Drainage    Date/Time: 6/22/2023 4:51 AM  Location procedure was performed: [facility] EMERGENCY DEPARTMENT  Performed by: Darryn Lucia DO  Authorized by: Darryn Lucia DO   Consent Done: Yes  Consent: Verbal consent obtained.  Risks and benefits: risks, benefits and alternatives were discussed  Consent given by: patient  Patient identity confirmed: verbally with patient  Type: abscess  Body area: anogenital  Location details: scrotal wall  Anesthesia: local infiltration    Anesthesia:  Local Anesthetic: lidocaine 1% without epinephrine  Anesthetic total: 10 mL    Patient sedated: no  Risk factor: underlying major vessel, underlying major nerve and coagulopathy  Scalpel size: 11  Incision type: single straight  Incision depth: dermal  Complexity: simple  Drainage: purulent  Drainage amount: moderate  Wound treatment: incision, expression of material and wound packed  Packing material: 1/4 in gauze  Patient tolerance: Patient tolerated the procedure well with no immediate complications    Incision depth: dermal           Diagnostic Impression:    1. Abscess of scrotal wall    2. Abscess of groin, right         ED Disposition Condition    Discharge Stable             Follow-up Information       [facility] - Emergency Dept.    Specialty: Emergency Medicine  Why: If symptoms worsen  Contact information:  [address] [facility] [address] [phone]             Schedule an appointment as soon as possible for a visit  with [facility].    Specialty: Urology  Contact information:  [address] [id number] [phone]                            ED Prescriptions       Medication Sig Dispense Start Date End Date Auth. Provider    sulfamethoxazole-trimethoprim 800-160mg (BACTRIM DS) 800-160 mg Tab Take 1 tablet by mouth 2 (two) times daily. for 10 days 20 tablet 6/22/2023 7/2/2023 Darryn CARRANZA  DO Rae    HYDROcodone-acetaminophen (NORCO)  mg per tablet Take 1 tablet by mouth every 6 (six) hours as needed for Pain. 15 tablet 6/22/2023 -- Darryn Lucia DO          Follow-up Information       Follow up With Specialties Details Why Contact Info    [facility] - Emergency Dept Emergency Medicine  If symptoms worsen [id number] [facility] [address] [phone]    [facility] Urology Schedule an appointment as soon as possible for a visit   [address] [address] [phone]                    Darryn Lucia DO  06/22/23 0591

## 2023-06-22 NOTE — ED TRIAGE NOTES
Pt has abscess r groin starting yesterday. Pt woke up this morning with numbness to r arm. Pt no longer having numbness to r arm.

## 2023-06-25 LAB — BACTERIA SPEC CULT: NORMAL

## 2023-07-20 ENCOUNTER — OFFICE VISIT (OUTPATIENT)
Dept: UROLOGY | Facility: CLINIC | Age: 43
End: 2023-07-20
Payer: MEDICAID

## 2023-07-20 ENCOUNTER — TELEPHONE (OUTPATIENT)
Dept: UROLOGY | Facility: CLINIC | Age: 43
End: 2023-07-20

## 2023-07-20 VITALS
RESPIRATION RATE: 18 BRPM | BODY MASS INDEX: 31.94 KG/M2 | DIASTOLIC BLOOD PRESSURE: 81 MMHG | WEIGHT: 235.81 LBS | HEIGHT: 72 IN | HEART RATE: 67 BPM | SYSTOLIC BLOOD PRESSURE: 132 MMHG | TEMPERATURE: 99 F | OXYGEN SATURATION: 100 %

## 2023-07-20 DIAGNOSIS — Z12.5 PROSTATE CANCER SCREENING: ICD-10-CM

## 2023-07-20 DIAGNOSIS — N49.2 SCROTAL WALL ABSCESS: Primary | ICD-10-CM

## 2023-07-20 LAB
BILIRUB SERPL-MCNC: NORMAL MG/DL
BLOOD URINE, POC: NORMAL
COLOR, POC UA: YELLOW
GLUCOSE UR QL STRIP: NORMAL
KETONES UR QL STRIP: NORMAL
LEUKOCYTE ESTERASE URINE, POC: NORMAL
NITRITE, POC UA: NORMAL
PH, POC UA: 5.5
PROTEIN, POC: NORMAL
SPECIFIC GRAVITY, POC UA: >=1.03
UROBILINOGEN, POC UA: 0.2

## 2023-07-20 PROCEDURE — 3008F PR BODY MASS INDEX (BMI) DOCUMENTED: ICD-10-PCS | Mod: CPTII,,, | Performed by: UROLOGY

## 2023-07-20 PROCEDURE — 1159F PR MEDICATION LIST DOCUMENTED IN MEDICAL RECORD: ICD-10-PCS | Mod: CPTII,,, | Performed by: UROLOGY

## 2023-07-20 PROCEDURE — 3079F PR MOST RECENT DIASTOLIC BLOOD PRESSURE 80-89 MM HG: ICD-10-PCS | Mod: CPTII,,, | Performed by: UROLOGY

## 2023-07-20 PROCEDURE — 99213 OFFICE O/P EST LOW 20 MIN: CPT | Mod: S$PBB,,, | Performed by: UROLOGY

## 2023-07-20 PROCEDURE — 1160F PR REVIEW ALL MEDS BY PRESCRIBER/CLIN PHARMACIST DOCUMENTED: ICD-10-PCS | Mod: CPTII,,, | Performed by: UROLOGY

## 2023-07-20 PROCEDURE — 3079F DIAST BP 80-89 MM HG: CPT | Mod: CPTII,,, | Performed by: UROLOGY

## 2023-07-20 PROCEDURE — 1160F RVW MEDS BY RX/DR IN RCRD: CPT | Mod: CPTII,,, | Performed by: UROLOGY

## 2023-07-20 PROCEDURE — 3075F PR MOST RECENT SYSTOLIC BLOOD PRESS GE 130-139MM HG: ICD-10-PCS | Mod: CPTII,,, | Performed by: UROLOGY

## 2023-07-20 PROCEDURE — 99214 OFFICE O/P EST MOD 30 MIN: CPT | Mod: PBBFAC | Performed by: UROLOGY

## 2023-07-20 PROCEDURE — 3075F SYST BP GE 130 - 139MM HG: CPT | Mod: CPTII,,, | Performed by: UROLOGY

## 2023-07-20 PROCEDURE — 1159F MED LIST DOCD IN RCRD: CPT | Mod: CPTII,,, | Performed by: UROLOGY

## 2023-07-20 PROCEDURE — 81001 URINALYSIS AUTO W/SCOPE: CPT | Mod: PBBFAC | Performed by: UROLOGY

## 2023-07-20 PROCEDURE — 99213 PR OFFICE/OUTPT VISIT, EST, LEVL III, 20-29 MIN: ICD-10-PCS | Mod: S$PBB,,, | Performed by: UROLOGY

## 2023-07-20 PROCEDURE — 3008F BODY MASS INDEX DOCD: CPT | Mod: CPTII,,, | Performed by: UROLOGY

## 2023-07-20 NOTE — PROGRESS NOTES
CC:  ER follow-up    HPI:  Domingo Matias is a 42 y.o. male seen for follow-up of scrotal wall abscess.  He went to the emergency room on 22 June 2023 for an abscess.  This was in the right groin and extended into the scrotal wall.  This was drained in the emergency room and packed.  Wound culture grew out normal beulah.  Patient states that the packing fell out and is healed up no problem.  Was also given Bactrim about time.  He currently has no complaints related to this.  He has not had a PSA yet.  He is unsure of his family history as he does not know his father and never met his grandfather.      Urinalysis:  Results for orders placed or performed in visit on 07/20/23   POCT URINE DIPSTICK WITH MICROSCOPE, AUTOMATED   Result Value Ref Range    Color, UA Yellow     Spec Grav UA >=1.030     pH, UA 5.5     WBC, UA neg     Nitrite, UA neg     Protein, POC neg     Glucose, UA neg     Ketones, UA neg     Urobilinogen, UA 0.2     Bilirubin, POC neg     Blood, UA neg      Microscopic:  Negative        Data Review:  ER visit notes from Darryn Lucia MD dated 22 June 2023.  Wound culture.    ROS:  All systems reviewed and are negative except as documented in HPI and/or Assessment and Plan.     Patient Active Problem List:     Patient Active Problem List   Diagnosis    Hypertension    Obesity    Depression, unspecified    Lumbar degenerative disc disease    Left hydrocele    Diverticulitis    Cigarette nicotine dependence with nicotine-induced disorder    Tinnitus of both ears    Other chest pain    History of bloody stools    IGT (impaired glucose tolerance)    Parosmia    Hyposmia    Bilateral varicoceles    Diverticulitis of sigmoid colon    Hematochezia    Anxiety    Other constipation    BMI 30.0-30.9,adult        Past Medical History:  Past Medical History:   Diagnosis Date    Diabetes mellitus     Hypertension         Past Surgical History:  Past Surgical History:   Procedure Laterality Date    APPENDECTOMY           Family History:  Family History   Problem Relation Age of Onset    Heart failure Mother     Kidney failure Mother     Diabetes Mother         Social History:  Social History     Socioeconomic History    Marital status:    Occupational History    Occupation: unemployed   Tobacco Use    Smoking status: Every Day     Packs/day: 0.50     Types: Cigarettes    Smokeless tobacco: Former     Types: Chew   Substance and Sexual Activity    Alcohol use: Yes     Comment: occasionally    Drug use: Not Currently     Types: Marijuana    Sexual activity: Yes     Partners: Female     Social Determinants of Health     Physical Activity: Inactive    Days of Exercise per Week: 0 days    Minutes of Exercise per Session: 0 min        Allergies:  Review of patient's allergies indicates:   Allergen Reactions    Iodine Nausea And Vomiting        Objective:  Vitals:    07/20/23 0809   BP: 132/81   Pulse: 67   Resp: 18   Temp: 98.5 °F (36.9 °C)     General:  Well developed, well nourished adult male in no acute distress  Abdomen: Soft, nontender, no masses  Extremities:  No clubbing, cyanosis, or edema  Neurologic:  Grossly intact  Musculoskeletal:  Normal tone  Penis:  Circumcised, no lesions  Scrotum:  No hydrocele.  The abscesses mostly in the groin in the incision is closed.  Testicles:  Nontender, no masses  Epididymis:  Normal without masses    Assessment:  1. Scrotal wall abscess  - POCT URINE DIPSTICK WITH MICROSCOPE, AUTOMATED    2. Prostate cancer screening  - PSA, Total (Diagnostic); Future     Plan:  Resolved scrotal wall abscess.  PSA today.  We will call with those results.    Follow-up:  PSA in one year and follow-up after.

## 2023-08-18 ENCOUNTER — PATIENT MESSAGE (OUTPATIENT)
Dept: GASTROENTEROLOGY | Facility: CLINIC | Age: 43
End: 2023-08-18
Payer: MEDICAID

## 2023-09-11 ENCOUNTER — ANESTHESIA EVENT (OUTPATIENT)
Dept: ENDOSCOPY | Facility: HOSPITAL | Age: 43
End: 2023-09-11
Payer: MEDICAID

## 2023-09-11 NOTE — ANESTHESIA PREPROCEDURE EVALUATION
09/11/2023  Domingo Matias is a 42 y.o., male. with PMHx of obesity, HTN, smoking, anxiety/depression presents for colonoscopy secondary to h/o diverticulitis.    NO BETA BLOCKER USE    Active Ambulatory Problems     Diagnosis Date Noted    Hypertension 11/03/2022    Obesity 11/03/2022    Depression, unspecified 11/03/2022    Lumbar degenerative disc disease 11/03/2022    Left hydrocele 11/03/2022    Diverticulitis 11/03/2022    Cigarette nicotine dependence with nicotine-induced disorder 11/03/2022    Tinnitus of both ears 11/03/2022    Other chest pain 11/03/2022    History of bloody stools 11/03/2022    IGT (impaired glucose tolerance) 11/17/2022    Parosmia 11/17/2022    Hyposmia 11/17/2022    Bilateral varicoceles 12/15/2022    Diverticulitis of sigmoid colon 02/01/2023    Hematochezia 02/01/2023    Anxiety 05/12/2023    Other constipation 06/01/2023    BMI 30.0-30.9,adult 06/01/2023     Resolved Ambulatory Problems     Diagnosis Date Noted    Tobacco user 11/03/2022     Past Medical History:   Diagnosis Date    Diabetes mellitus        Pre-op Assessment    I have reviewed the NPO Status.      Review of Systems  Anesthesia Hx:  No problems with previous Anesthesia    Social:  Smoker    Cardiovascular:   Hypertension, well controlled    Pulmonary:  Pulmonary Normal    Renal/:  Renal/ Normal     Hepatic/GI:   Bowel Prep.    Neurological:  Neurology Normal    Endocrine:  Obesity / BMI > 30  Psych:   anxiety depression        Vitals:    09/13/23 0955   BP: (!) 140/66   BP Location: Left arm   Patient Position: Lying   Pulse: 70   Resp: 17   Temp: 36.8 °C (98.2 °F)   TempSrc: Oral   SpO2: 97%   Weight: 108 kg (238 lb)   Height: 6' (1.829 m)         Physical Exam  General: Alert, Cooperative and Well nourished    Airway:  Mallampati: II   Mouth Opening: Normal  TM Distance:  Normal  Tongue: Normal  Neck ROM: Normal ROM    Dental:  Intact    Chest/Lungs:  Clear to auscultation, Normal Respiratory Rate    Heart:  Rate: Normal  Rhythm: Regular Rhythm  Sounds: Normal      Lab Results   Component Value Date    WBC 8.39 05/06/2023    HGB 14.5 05/06/2023    HCT 43.8 05/06/2023    MCV 87.4 05/06/2023     05/06/2023       CMP  Sodium Level   Date Value Ref Range Status   05/06/2023 137 136 - 145 mmol/L Final     Potassium Level   Date Value Ref Range Status   05/06/2023 4.4 3.5 - 5.1 mmol/L Final     Carbon Dioxide   Date Value Ref Range Status   05/06/2023 23 22 - 29 mmol/L Final     Blood Urea Nitrogen   Date Value Ref Range Status   05/06/2023 8.0 (L) 8.9 - 20.6 mg/dL Final     Creatinine   Date Value Ref Range Status   05/06/2023 1.04 0.73 - 1.18 mg/dL Final     Calcium Level Total   Date Value Ref Range Status   05/06/2023 9.8 8.4 - 10.2 mg/dL Final     Albumin Level   Date Value Ref Range Status   05/06/2023 4.0 3.5 - 5.0 g/dL Final     Bilirubin Total   Date Value Ref Range Status   05/06/2023 0.9 <=1.5 mg/dL Final     Alkaline Phosphatase   Date Value Ref Range Status   05/06/2023 52 40 - 150 unit/L Final     Aspartate Aminotransferase   Date Value Ref Range Status   05/06/2023 17 5 - 34 unit/L Final     Alanine Aminotransferase   Date Value Ref Range Status   05/06/2023 19 0 - 55 unit/L Final     eGFR   Date Value Ref Range Status   05/06/2023 >60 mls/min/1.73/m2 Final     Lab Results   Component Value Date    HGBA1C 5.7 11/08/2022           Anesthesia Plan  Type of Anesthesia, risks & benefits discussed:    Anesthesia Type: Gen Natural Airway  Intra-op Monitoring Plan: Standard ASA Monitors  Post Op Pain Control Plan: IV/PO Opioids PRN  Induction:  IV  Informed Consent: Informed consent signed with the Patient and all parties understand the risks and agree with anesthesia plan.  All questions answered.   ASA Score: 2  Day of Surgery Review of History & Physical: H&P Update  referred to the surgeon/provider.    Ready For Surgery From Anesthesia Perspective.     .

## 2023-09-13 ENCOUNTER — HOSPITAL ENCOUNTER (OUTPATIENT)
Facility: HOSPITAL | Age: 43
Discharge: HOME | End: 2023-09-13
Attending: INTERNAL MEDICINE | Admitting: INTERNAL MEDICINE
Payer: MEDICAID

## 2023-09-13 ENCOUNTER — ANESTHESIA (OUTPATIENT)
Dept: ENDOSCOPY | Facility: HOSPITAL | Age: 43
End: 2023-09-13
Payer: MEDICAID

## 2023-09-13 VITALS
HEIGHT: 72 IN | OXYGEN SATURATION: 100 % | SYSTOLIC BLOOD PRESSURE: 118 MMHG | TEMPERATURE: 98 F | WEIGHT: 238 LBS | HEART RATE: 60 BPM | DIASTOLIC BLOOD PRESSURE: 85 MMHG | BODY MASS INDEX: 32.23 KG/M2 | RESPIRATION RATE: 17 BRPM

## 2023-09-13 DIAGNOSIS — K57.32 DIVERTICULITIS OF SIGMOID COLON: ICD-10-CM

## 2023-09-13 DIAGNOSIS — K92.1 HEMATOCHEZIA: ICD-10-CM

## 2023-09-13 PROCEDURE — 27201423 OPTIME MED/SURG SUP & DEVICES STERILE SUPPLY: Performed by: INTERNAL MEDICINE

## 2023-09-13 PROCEDURE — 37000009 HC ANESTHESIA EA ADD 15 MINS: Performed by: INTERNAL MEDICINE

## 2023-09-13 PROCEDURE — 45385 COLONOSCOPY W/LESION REMOVAL: CPT | Performed by: INTERNAL MEDICINE

## 2023-09-13 PROCEDURE — 37000008 HC ANESTHESIA 1ST 15 MINUTES: Performed by: INTERNAL MEDICINE

## 2023-09-13 PROCEDURE — 25000003 PHARM REV CODE 250: Performed by: NURSE ANESTHETIST, CERTIFIED REGISTERED

## 2023-09-13 PROCEDURE — D9220A PRA ANESTHESIA: Mod: ,,, | Performed by: NURSE ANESTHETIST, CERTIFIED REGISTERED

## 2023-09-13 PROCEDURE — 88305 TISSUE EXAM BY PATHOLOGIST: CPT | Mod: TC | Performed by: INTERNAL MEDICINE

## 2023-09-13 PROCEDURE — 63600175 PHARM REV CODE 636 W HCPCS: Performed by: NURSE ANESTHETIST, CERTIFIED REGISTERED

## 2023-09-13 PROCEDURE — 63600175 PHARM REV CODE 636 W HCPCS: Performed by: SPECIALIST

## 2023-09-13 PROCEDURE — D9220A PRA ANESTHESIA: ICD-10-PCS | Mod: ,,, | Performed by: NURSE ANESTHETIST, CERTIFIED REGISTERED

## 2023-09-13 RX ORDER — LIDOCAINE HYDROCHLORIDE 20 MG/ML
INJECTION, SOLUTION EPIDURAL; INFILTRATION; INTRACAUDAL; PERINEURAL
Status: DISCONTINUED | OUTPATIENT
Start: 2023-09-13 | End: 2023-09-13

## 2023-09-13 RX ORDER — SODIUM CHLORIDE, SODIUM LACTATE, POTASSIUM CHLORIDE, CALCIUM CHLORIDE 600; 310; 30; 20 MG/100ML; MG/100ML; MG/100ML; MG/100ML
INJECTION, SOLUTION INTRAVENOUS CONTINUOUS
Status: DISCONTINUED | OUTPATIENT
Start: 2023-09-13 | End: 2023-09-13 | Stop reason: HOSPADM

## 2023-09-13 RX ORDER — PROPOFOL 10 MG/ML
VIAL (ML) INTRAVENOUS
Status: DISCONTINUED | OUTPATIENT
Start: 2023-09-13 | End: 2023-09-13

## 2023-09-13 RX ADMIN — SODIUM CHLORIDE, POTASSIUM CHLORIDE, SODIUM LACTATE AND CALCIUM CHLORIDE: 600; 310; 30; 20 INJECTION, SOLUTION INTRAVENOUS at 09:09

## 2023-09-13 RX ADMIN — PROPOFOL 40 MG: 10 INJECTION, EMULSION INTRAVENOUS at 11:09

## 2023-09-13 RX ADMIN — PROPOFOL 100 MG: 10 INJECTION, EMULSION INTRAVENOUS at 10:09

## 2023-09-13 RX ADMIN — LIDOCAINE HYDROCHLORIDE 50 MG: 20 INJECTION, SOLUTION EPIDURAL; INFILTRATION; INTRACAUDAL; PERINEURAL at 10:09

## 2023-09-13 RX ADMIN — PROPOFOL 30 MG: 10 INJECTION, EMULSION INTRAVENOUS at 11:09

## 2023-09-13 RX ADMIN — PROPOFOL 40 MG: 10 INJECTION, EMULSION INTRAVENOUS at 10:09

## 2023-09-13 NOTE — PROVATION PATIENT INSTRUCTIONS
Discharge Summary/Instructions after an Endoscopic Procedure  Patient Name: Domingo Matias  Patient MRN: [id number]  Patient YOB: 1980  Wednesday, September 13, 2023  Sunshine Wen MD  Dear patient,  As a result of recent federal legislation (The Federal Cures Act), you may   receive lab or pathology results from your procedure in your [facility]   account before your physician is able to contact you. Your physician or   their representative will relay the results to you with their   recommendations at their soonest availability.  Thank you,  RESTRICTIONS:  During your procedure today, you received medications for sedation.  These   medications may affect your judgment, balance and coordination.  Therefore,   for 24 hours, you have the following restrictions:   - DO NOT drive a car, operate machinery, make legal/financial decisions,   sign important papers or drink alcohol.    ACTIVITY:  Today: no heavy lifting, straining or running due to procedural   sedation/anesthesia.  The following day: return to full activity including work.  DIET:  Eat and drink normally unless instructed otherwise.     TREATMENT FOR COMMON SIDE EFFECTS:  - Mild abdominal pain, nausea, belching, bloating or excessive gas:  rest,   eat lightly and use a heating pad.  - Sore Throat: treat with throat lozenges and/or gargle with warm salt   water.  - Because air was used during the procedure, expelling large amounts of air   from your rectum or belching is normal.  - If a bowel prep was taken, you may not have a bowel movement for 1-3 days.    This is normal.  SYMPTOMS TO WATCH FOR AND REPORT TO YOUR PHYSICIAN:  1. Abdominal pain or bloating, other than gas cramps.  2. Chest pain.  3. Back pain.  4. Signs of infection such as: chills or fever occurring within 24 hours   after the procedure.  5. Rectal bleeding, which would show as bright red, maroon, or black stools.   (A tablespoon of blood from the rectum is not serious,  especially if   hemorrhoids are present.)  6. Vomiting.  7. Weakness or dizziness.  GO DIRECTLY TO THE NEAREST EMERGENCY ROOM IF YOU HAVE ANY OF THE FOLLOWING:      Difficulty breathing              Chills and/or fever over 101 F   Persistent vomiting and/or vomiting blood   Severe abdominal pain   Severe chest pain   Black, tarry stools   Bleeding- more than one tablespoon   Any other symptom or condition that you feel may need urgent attention  Your doctor recommends these additional instructions:  If any biopsies were taken, your doctors clinic will contact you in 1 to 2   weeks with any results.  - Patient has a contact number available for emergencies.  The signs and   symptoms of potential delayed complications were discussed with the   patient.  Return to normal activities tomorrow.  Written discharge   instructions were provided to the patient.   - Discharge patient to home.   - Resume previous diet.   - Continue present medications.   - Await pathology results.   - Repeat colonoscopy in 7 years for surveillance.   - Use fiber, for example Citrucel, Fibercon, Konsyl or Metamucil.  For questions, problems or results please call your physician - Sunshine Wen MD at Work:  [phone], Work:  [phone].  [facility] , EMERGENCY ROOM PHONE NUMBER: [phone]  IF A COMPLICATION OR EMERGENCY SITUATION ARISES AND YOU ARE UNABLE TO REACH   YOUR PHYSICIAN - GO DIRECTLY TO THE EMERGENCY ROOM.  Sunshine Wen MD  9/13/2023 12:09:10 PM  This report has been verified and signed electronically.  Dear patient,  As a result of recent federal legislation (The Federal Cures Act), you may   receive lab or pathology results from your procedure in your [facility]   account before your physician is able to contact you. Your physician or   their representative will relay the results to you with their   recommendations at their soonest availability.  Thank you,  PROVATION

## 2023-09-13 NOTE — H&P
[facility]  Gastroenterology  H&P    Patient Name: Domingo Matias  MRN: [id number]  Admission Date: 9/13/2023  Code Status: No Order    Attending Provider: Sunshine Wen MD   Primary Care Physician: Galina Paulson NP  Principal Problem:<principal problem not specified>    Subjective:     History of Present Illness: 42y M w/pmhx of DM and HTN presenting for colonoscopy after being seen in October of last year for  fever and LLQ abdominal pain suspected to be secondary diverticulitis. He was treated with antibioitics in the outpatient setting with resolution of his symptoms. He had several episodes of LLQ pain since then, and presented to the ED for one such episode in May, but did not require admission.  He has also been having episodes of intermittent BRPR w/bowel movements, with the blood only being present on the toilet paper. He denies any straining or changes in bowel function. He denies any family history of IBD, polyps, or colorectal cancer.     Past Medical History:   Diagnosis Date    Diabetes mellitus     Hypertension        Past Surgical History:   Procedure Laterality Date    APPENDECTOMY         Review of patient's allergies indicates:   Allergen Reactions    Iodine Nausea And Vomiting     Family History       Problem Relation (Age of Onset)    Diabetes Mother    Heart failure Mother    Kidney failure Mother          Tobacco Use    Smoking status: Every Day     Current packs/day: 1.00     Types: Cigarettes    Smokeless tobacco: Former     Types: Chew   Substance and Sexual Activity    Alcohol use: Yes     Comment: occasionally    Drug use: Not Currently     Types: Marijuana    Sexual activity: Yes     Partners: Female     Review of Systems   Gastrointestinal:  Positive for abdominal pain.   All other systems reviewed and are negative.    Objective:     Vital Signs (Most Recent):  Temp: 98.2 °F (36.8 °C) (09/13/23 0955)  Pulse: 70 (09/13/23 0955)  Resp: 17 (09/13/23 0955)  BP:  (!) 140/66 (09/13/23 0955)  SpO2: 97 % (09/13/23 0955) Vital Signs (24h Range):  Temp:  [98.2 °F (36.8 °C)] 98.2 °F (36.8 °C)  Pulse:  [70] 70  Resp:  [17] 17  SpO2:  [97 %] 97 %  BP: (140)/(66) 140/66     Weight: 108 kg (238 lb) (09/13/23 0955)  Body mass index is 32.28 kg/m².    No intake or output data in the 24 hours ending 09/13/23 1031    Lines/Drains/Airways       Peripheral Intravenous Line  Duration                  Peripheral IV - Single Lumen 09/13/23 0959 20 G Posterior;Right Hand <1 day                    Physical Exam  Constitutional:       Appearance: Normal appearance.   HENT:      Head: Normocephalic and atraumatic.   Eyes:      Extraocular Movements: Extraocular movements intact.   Cardiovascular:      Rate and Rhythm: Normal rate and regular rhythm.   Pulmonary:      Effort: Pulmonary effort is normal.   Abdominal:      General: Abdomen is flat. There is no distension.      Palpations: Abdomen is soft.      Tenderness: There is no abdominal tenderness.   Neurological:      Mental Status: He is alert.         Significant Labs:  All pertinent lab results from the last 24 hours have been reviewed.    Significant Imaging:  Imaging results within the past 24 hours have been reviewed.    Assessment/Plan:   42y M w/diverticulitis and BRPR presenting for colonoscopy.    - Prep complete. To endoscopy suite for colonoscopy.      Chilo Herr MD  Gastroenterology  [facility]

## 2023-09-13 NOTE — ANESTHESIA POSTPROCEDURE EVALUATION
Anesthesia Post Evaluation    Patient: Domingo Matias    Procedure(s) Performed: Procedure(s) (LRB):  COLONOSCOPY, WITH POLYPECTOMY USING SNARE (N/A)    Final Anesthesia Type: general      Patient location during evaluation: GI PACU  Patient participation: Yes- Able to Participate  Level of consciousness: awake and alert  Post-procedure vital signs: reviewed and stable  Pain management: adequate  Airway patency: patent    PONV status at discharge: No PONV  Anesthetic complications: no      Cardiovascular status: hemodynamically stable  Respiratory status: unassisted and room air  Follow-up not needed.            Pain/Manuela Score: Manuela Score: 10 (9/13/2023 11:49 AM)

## 2023-09-13 NOTE — PLAN OF CARE
Back from procedure. Awake alert. Ao4. No acute distress. Sipping coke and water. Dc instructions given and reviewed

## 2023-09-13 NOTE — TRANSFER OF CARE
Anesthesia Transfer of Care Note    Patient: Domingo Matias    Procedure(s) Performed: Procedure(s) (LRB):  COLONOSCOPY, WITH POLYPECTOMY USING SNARE (N/A)    Patient location: GI    Anesthesia Type: general    Post pain: adequate analgesia    Post assessment: no apparent anesthetic complications and tolerated procedure well    Post vital signs: stable    Level of consciousness: awake    Nausea/Vomiting: no nausea/vomiting    Complications: none    Transfer of care protocol was followed

## 2023-09-14 LAB
ESTROGEN SERPL-MCNC: NORMAL PG/ML
INSULIN SERPL-ACNC: NORMAL U[IU]/ML
LAB AP CLINICAL INFORMATION: NORMAL
LAB AP GROSS DESCRIPTION: NORMAL
LAB AP REPORT FOOTNOTES: NORMAL
T3RU NFR SERPL: NORMAL %

## 2023-09-16 ENCOUNTER — HOSPITAL ENCOUNTER (EMERGENCY)
Facility: HOSPITAL | Age: 43
Discharge: HOME | End: 2023-09-16
Attending: EMERGENCY MEDICINE
Payer: MEDICAID

## 2023-09-16 VITALS
TEMPERATURE: 99 F | WEIGHT: 230 LBS | SYSTOLIC BLOOD PRESSURE: 119 MMHG | DIASTOLIC BLOOD PRESSURE: 83 MMHG | HEIGHT: 72 IN | HEART RATE: 80 BPM | RESPIRATION RATE: 15 BRPM | OXYGEN SATURATION: 100 % | BODY MASS INDEX: 31.15 KG/M2

## 2023-09-16 DIAGNOSIS — L03.011 PARONYCHIA OF FINGER OF RIGHT HAND: Primary | ICD-10-CM

## 2023-09-16 PROCEDURE — 99283 EMERGENCY DEPT VISIT LOW MDM: CPT

## 2023-09-16 RX ORDER — SULFAMETHOXAZOLE AND TRIMETHOPRIM 800; 160 MG/1; MG/1
1 TABLET ORAL 2 TIMES DAILY
Qty: 14 TABLET | Refills: 0 | Status: SHIPPED | OUTPATIENT
Start: 2023-09-16 | End: 2023-09-23

## 2023-09-16 NOTE — ED PROVIDER NOTES
Encounter Date: 9/16/2023     History     Chief Complaint   Patient presents with    Hand Pain     Right 4th finger pain x yesterday. Atraumatic. Swelling and redness to cuticle noted.     Patient with pmhx of HTN presents today c/o pain to proximal nail fold of right 4th digit since yesterday. The area is tender to touch. No relieving factors. He denies any other associated symptoms.     The history is provided by the patient. No  was used.     Review of patient's allergies indicates:   Allergen Reactions    Iodine Nausea And Vomiting     Past Medical History:   Diagnosis Date    Diabetes mellitus     Hypertension      Past Surgical History:   Procedure Laterality Date    APPENDECTOMY      COLONOSCOPY, WITH POLYPECTOMY USING SNARE N/A 9/13/2023    Procedure: COLONOSCOPY, WITH POLYPECTOMY USING SNARE;  Surgeon: Sunshine Wen MD;  Location: [facility] ENDOSCOPY;  Service: Gastroenterology;  Laterality: N/A;     Family History   Problem Relation Age of Onset    Heart failure Mother     Kidney failure Mother     Diabetes Mother      Social History     Tobacco Use    Smoking status: Every Day     Current packs/day: 1.00     Types: Cigarettes    Smokeless tobacco: Former     Types: Chew   Substance Use Topics    Alcohol use: Yes     Comment: occasionally    Drug use: Not Currently     Types: Marijuana     Review of Systems   Constitutional:  Negative for chills and fever.   Respiratory:  Negative for shortness of breath.    Cardiovascular:  Negative for chest pain.   Gastrointestinal:  Negative for abdominal pain, constipation, diarrhea, nausea and vomiting.   Genitourinary:  Negative for flank pain.   Musculoskeletal:         Right finger pain   Skin:  Negative for rash.   Neurological:  Negative for syncope, light-headedness and headaches.   All other systems reviewed and are negative.      Physical Exam     Initial Vitals [09/16/23 1218]   BP Pulse Resp Temp SpO2   119/83 80 15 98.5 °F (36.9 °C)  100 %      MAP       --         Physical Exam    Nursing note and vitals reviewed.  Constitutional: He appears well-developed and well-nourished. He is not diaphoretic. No distress.   HENT:   Head: Normocephalic and atraumatic.   Mouth/Throat: Oropharynx is clear and moist. No oropharyngeal exudate.   Eyes: Conjunctivae and EOM are normal.   Neck: Neck supple.   Normal range of motion.  Cardiovascular:  Normal rate, regular rhythm, normal heart sounds and intact distal pulses.           Pulmonary/Chest: Breath sounds normal. No respiratory distress.   Abdominal: Abdomen is soft. He exhibits no distension.   Musculoskeletal:         General: No edema. Normal range of motion.      Cervical back: Normal range of motion and neck supple.     Neurological: He is alert and oriented to person, place, and time. GCS score is 15. GCS eye subscore is 4. GCS verbal subscore is 5. GCS motor subscore is 6.   Skin: Skin is warm and dry. Capillary refill takes less than 2 seconds. No rash noted.   Right 4th digit with minimal swelling and erythema noted to proximal nail fold. No fluctuance or drainage. Consistent with paronychia.    Psychiatric: He has a normal mood and affect.         ED Course   Procedures  Labs Reviewed - No data to display       Imaging Results    None          Medications - No data to display  Medical Decision Making  Patient with pain to right 4th proximal nail fold since yesterday    Ddx: paronychia, ingrown nail, abscess    Patient is non-toxic appearing. Vitals are stable. Exam findings consistent with paronychia. There is no drainable fluid collection. Recommend bactrim for 7 days. Advised to f/u with pcp or return to ED in 2-3 days for re-evaluation or sooner if worsening. He is stable for discharge. ED precautions given.     Amount and/or Complexity of Data Reviewed  External Data Reviewed: notes.     Details: Prior clinic notes reviewed                            Clinical Impression:   Final  diagnoses:  [L03.011] Paronychia of finger of right hand (Primary)        ED Disposition Condition    Discharge Stable          ED Prescriptions       Medication Sig Dispense Start Date End Date Auth. Provider    sulfamethoxazole-trimethoprim 800-160mg (BACTRIM DS) 800-160 mg Tab Take 1 tablet by mouth 2 (two) times daily. for 7 days 14 tablet 9/16/2023 9/23/2023 vEe Greene PA          Follow-up Information       Follow up With Specialties Details Why Contact Info    [facility] Emergency Medicine In 3 days If symptoms worsen return to ED immediately [address] [facility] [id number] [phone]    Galina Paulson, NP Nurse Practitioner In 2 days  [address] [facility] [address]  [phone]               Eve Greene PA  09/16/23 8460

## 2023-11-13 ENCOUNTER — OFFICE VISIT (OUTPATIENT)
Dept: INTERNAL MEDICINE | Facility: CLINIC | Age: 43
End: 2023-11-13
Payer: MEDICAID

## 2023-11-13 ENCOUNTER — LAB VISIT (OUTPATIENT)
Dept: LAB | Facility: HOSPITAL | Age: 43
End: 2023-11-13
Attending: NURSE PRACTITIONER
Payer: MEDICAID

## 2023-11-13 VITALS
HEIGHT: 72 IN | WEIGHT: 249.19 LBS | HEART RATE: 76 BPM | BODY MASS INDEX: 33.75 KG/M2 | DIASTOLIC BLOOD PRESSURE: 80 MMHG | RESPIRATION RATE: 20 BRPM | TEMPERATURE: 98 F | SYSTOLIC BLOOD PRESSURE: 136 MMHG

## 2023-11-13 DIAGNOSIS — G47.19 EXCESSIVE DAYTIME SLEEPINESS: ICD-10-CM

## 2023-11-13 DIAGNOSIS — R07.89 OTHER CHEST PAIN: ICD-10-CM

## 2023-11-13 DIAGNOSIS — Z12.5 PROSTATE CANCER SCREENING: ICD-10-CM

## 2023-11-13 DIAGNOSIS — R06.83 SNORING: ICD-10-CM

## 2023-11-13 DIAGNOSIS — E66.9 CLASS 1 OBESITY WITHOUT SERIOUS COMORBIDITY WITH BODY MASS INDEX (BMI) OF 32.0 TO 32.9 IN ADULT, UNSPECIFIED OBESITY TYPE: ICD-10-CM

## 2023-11-13 DIAGNOSIS — F32.A ANXIETY AND DEPRESSION: ICD-10-CM

## 2023-11-13 DIAGNOSIS — F17.219 CIGARETTE NICOTINE DEPENDENCE WITH NICOTINE-INDUCED DISORDER: ICD-10-CM

## 2023-11-13 DIAGNOSIS — Z00.00 WELLNESS EXAMINATION: Primary | ICD-10-CM

## 2023-11-13 DIAGNOSIS — F41.9 ANXIETY AND DEPRESSION: ICD-10-CM

## 2023-11-13 DIAGNOSIS — R73.02 IGT (IMPAIRED GLUCOSE TOLERANCE): ICD-10-CM

## 2023-11-13 DIAGNOSIS — R06.89 GASPING FOR BREATH: ICD-10-CM

## 2023-11-13 DIAGNOSIS — Z11.9 SCREENING EXAMINATION FOR INFECTIOUS DISEASE: ICD-10-CM

## 2023-11-13 DIAGNOSIS — I10 PRIMARY HYPERTENSION: ICD-10-CM

## 2023-11-13 DIAGNOSIS — H93.13 TINNITUS OF BOTH EARS: ICD-10-CM

## 2023-11-13 LAB
ALBUMIN SERPL-MCNC: 4 G/DL (ref 3.5–5)
ALBUMIN/GLOB SERPL: 1.3 RATIO (ref 1.1–2)
ALP SERPL-CCNC: 58 UNIT/L (ref 40–150)
ALT SERPL-CCNC: 40 UNIT/L (ref 0–55)
AST SERPL-CCNC: 27 UNIT/L (ref 5–34)
BASOPHILS # BLD AUTO: 0.06 X10(3)/MCL
BASOPHILS NFR BLD AUTO: 1.1 %
BILIRUB SERPL-MCNC: 0.6 MG/DL
BUN SERPL-MCNC: 12.2 MG/DL (ref 8.9–20.6)
CALCIUM SERPL-MCNC: 9.3 MG/DL (ref 8.4–10.2)
CHLORIDE SERPL-SCNC: 106 MMOL/L (ref 98–107)
CHOLEST SERPL-MCNC: 182 MG/DL
CHOLEST/HDLC SERPL: 3 {RATIO} (ref 0–5)
CO2 SERPL-SCNC: 26 MMOL/L (ref 22–29)
CREAT SERPL-MCNC: 1.19 MG/DL (ref 0.73–1.18)
CREAT UR-MCNC: 165.6 MG/DL (ref 63–166)
EOSINOPHIL # BLD AUTO: 0.11 X10(3)/MCL (ref 0–0.9)
EOSINOPHIL NFR BLD AUTO: 2 %
ERYTHROCYTE [DISTWIDTH] IN BLOOD BY AUTOMATED COUNT: 13.3 % (ref 11.5–17)
EST. AVERAGE GLUCOSE BLD GHB EST-MCNC: 116.9 MG/DL
GFR SERPLBLD CREATININE-BSD FMLA CKD-EPI: >60 MLS/MIN/1.73/M2
GLOBULIN SER-MCNC: 3.2 GM/DL (ref 2.4–3.5)
GLUCOSE SERPL-MCNC: 105 MG/DL (ref 74–100)
HAV IGM SERPL QL IA: NONREACTIVE
HBA1C MFR BLD: 5.7 %
HBV CORE IGM SERPL QL IA: NONREACTIVE
HBV SURFACE AG SERPL QL IA: NONREACTIVE
HCT VFR BLD AUTO: 46.8 % (ref 42–52)
HCV AB SERPL QL IA: NONREACTIVE
HDLC SERPL-MCNC: 67 MG/DL (ref 35–60)
HGB BLD-MCNC: 15.1 G/DL (ref 14–18)
HIV 1+2 AB+HIV1 P24 AG SERPL QL IA: NONREACTIVE
IMM GRANULOCYTES # BLD AUTO: 0.01 X10(3)/MCL (ref 0–0.04)
IMM GRANULOCYTES NFR BLD AUTO: 0.2 %
LDLC SERPL CALC-MCNC: 102 MG/DL (ref 50–140)
LYMPHOCYTES # BLD AUTO: 2.45 X10(3)/MCL (ref 0.6–4.6)
LYMPHOCYTES NFR BLD AUTO: 45.5 %
MCH RBC QN AUTO: 28.2 PG (ref 27–31)
MCHC RBC AUTO-ENTMCNC: 32.3 G/DL (ref 33–36)
MCV RBC AUTO: 87.5 FL (ref 80–94)
MICROALBUMIN UR-MCNC: <5 UG/ML
MICROALBUMIN/CREAT RATIO PNL UR: NORMAL
MONOCYTES # BLD AUTO: 0.48 X10(3)/MCL (ref 0.1–1.3)
MONOCYTES NFR BLD AUTO: 8.9 %
NEUTROPHILS # BLD AUTO: 2.28 X10(3)/MCL (ref 2.1–9.2)
NEUTROPHILS NFR BLD AUTO: 42.3 %
NRBC BLD AUTO-RTO: 0 %
PLATELET # BLD AUTO: 280 X10(3)/MCL (ref 130–400)
PMV BLD AUTO: 10.6 FL (ref 7.4–10.4)
POTASSIUM SERPL-SCNC: 4.4 MMOL/L (ref 3.5–5.1)
PROT SERPL-MCNC: 7.2 GM/DL (ref 6.4–8.3)
PSA SERPL-MCNC: 0.79 NG/ML
RBC # BLD AUTO: 5.35 X10(6)/MCL (ref 4.7–6.1)
SODIUM SERPL-SCNC: 140 MMOL/L (ref 136–145)
T PALLIDUM AB SER QL: NONREACTIVE
TRIGL SERPL-MCNC: 65 MG/DL (ref 34–140)
TSH SERPL-ACNC: 0.66 UIU/ML (ref 0.35–4.94)
VLDLC SERPL CALC-MCNC: 13 MG/DL
WBC # SPEC AUTO: 5.39 X10(3)/MCL (ref 4.5–11.5)

## 2023-11-13 PROCEDURE — 99396 PR PREVENTIVE VISIT,EST,40-64: ICD-10-PCS | Mod: S$PBB,,, | Performed by: NURSE PRACTITIONER

## 2023-11-13 PROCEDURE — 85025 COMPLETE CBC W/AUTO DIFF WBC: CPT

## 2023-11-13 PROCEDURE — 3008F BODY MASS INDEX DOCD: CPT | Mod: CPTII,,, | Performed by: NURSE PRACTITIONER

## 2023-11-13 PROCEDURE — 3044F HG A1C LEVEL LT 7.0%: CPT | Mod: CPTII,,, | Performed by: NURSE PRACTITIONER

## 2023-11-13 PROCEDURE — 1159F PR MEDICATION LIST DOCUMENTED IN MEDICAL RECORD: ICD-10-PCS | Mod: CPTII,,, | Performed by: NURSE PRACTITIONER

## 2023-11-13 PROCEDURE — 99212 OFFICE O/P EST SF 10 MIN: CPT | Mod: S$PBB,25,, | Performed by: NURSE PRACTITIONER

## 2023-11-13 PROCEDURE — 1160F PR REVIEW ALL MEDS BY PRESCRIBER/CLIN PHARMACIST DOCUMENTED: ICD-10-PCS | Mod: CPTII,,, | Performed by: NURSE PRACTITIONER

## 2023-11-13 PROCEDURE — 36415 COLL VENOUS BLD VENIPUNCTURE: CPT

## 2023-11-13 PROCEDURE — 86780 TREPONEMA PALLIDUM: CPT

## 2023-11-13 PROCEDURE — 80053 COMPREHEN METABOLIC PANEL: CPT

## 2023-11-13 PROCEDURE — 3061F PR NEG MICROALBUMINURIA RESULT DOCUMENTED/REVIEW: ICD-10-PCS | Mod: CPTII,,, | Performed by: NURSE PRACTITIONER

## 2023-11-13 PROCEDURE — 1159F MED LIST DOCD IN RCRD: CPT | Mod: CPTII,,, | Performed by: NURSE PRACTITIONER

## 2023-11-13 PROCEDURE — 84153 ASSAY OF PSA TOTAL: CPT

## 2023-11-13 PROCEDURE — 99396 PREV VISIT EST AGE 40-64: CPT | Mod: S$PBB,,, | Performed by: NURSE PRACTITIONER

## 2023-11-13 PROCEDURE — 80074 ACUTE HEPATITIS PANEL: CPT

## 2023-11-13 PROCEDURE — 87389 HIV-1 AG W/HIV-1&-2 AB AG IA: CPT

## 2023-11-13 PROCEDURE — 84443 ASSAY THYROID STIM HORMONE: CPT

## 2023-11-13 PROCEDURE — 3066F NEPHROPATHY DOC TX: CPT | Mod: CPTII,,, | Performed by: NURSE PRACTITIONER

## 2023-11-13 PROCEDURE — 3008F PR BODY MASS INDEX (BMI) DOCUMENTED: ICD-10-PCS | Mod: CPTII,,, | Performed by: NURSE PRACTITIONER

## 2023-11-13 PROCEDURE — 1160F RVW MEDS BY RX/DR IN RCRD: CPT | Mod: CPTII,,, | Performed by: NURSE PRACTITIONER

## 2023-11-13 PROCEDURE — 82043 UR ALBUMIN QUANTITATIVE: CPT

## 2023-11-13 PROCEDURE — 99406 PR TOBACCO USE CESSATION INTERMEDIATE 3-10 MINUTES: ICD-10-PCS | Mod: S$PBB,,, | Performed by: NURSE PRACTITIONER

## 2023-11-13 PROCEDURE — 99406 BEHAV CHNG SMOKING 3-10 MIN: CPT | Mod: S$PBB,,, | Performed by: NURSE PRACTITIONER

## 2023-11-13 PROCEDURE — 99215 OFFICE O/P EST HI 40 MIN: CPT | Mod: PBBFAC | Performed by: NURSE PRACTITIONER

## 2023-11-13 PROCEDURE — 3075F PR MOST RECENT SYSTOLIC BLOOD PRESS GE 130-139MM HG: ICD-10-PCS | Mod: CPTII,,, | Performed by: NURSE PRACTITIONER

## 2023-11-13 PROCEDURE — 83036 HEMOGLOBIN GLYCOSYLATED A1C: CPT

## 2023-11-13 PROCEDURE — 3079F PR MOST RECENT DIASTOLIC BLOOD PRESSURE 80-89 MM HG: ICD-10-PCS | Mod: CPTII,,, | Performed by: NURSE PRACTITIONER

## 2023-11-13 PROCEDURE — 3066F PR DOCUMENTATION OF TREATMENT FOR NEPHROPATHY: ICD-10-PCS | Mod: CPTII,,, | Performed by: NURSE PRACTITIONER

## 2023-11-13 PROCEDURE — 3061F NEG MICROALBUMINURIA REV: CPT | Mod: CPTII,,, | Performed by: NURSE PRACTITIONER

## 2023-11-13 PROCEDURE — 80061 LIPID PANEL: CPT

## 2023-11-13 PROCEDURE — 3075F SYST BP GE 130 - 139MM HG: CPT | Mod: CPTII,,, | Performed by: NURSE PRACTITIONER

## 2023-11-13 PROCEDURE — 99212 PR OFFICE/OUTPT VISIT, EST, LEVL II, 10-19 MIN: ICD-10-PCS | Mod: S$PBB,25,, | Performed by: NURSE PRACTITIONER

## 2023-11-13 PROCEDURE — 3079F DIAST BP 80-89 MM HG: CPT | Mod: CPTII,,, | Performed by: NURSE PRACTITIONER

## 2023-11-13 PROCEDURE — 3044F PR MOST RECENT HEMOGLOBIN A1C LEVEL <7.0%: ICD-10-PCS | Mod: CPTII,,, | Performed by: NURSE PRACTITIONER

## 2023-11-13 RX ORDER — DILTIAZEM HYDROCHLORIDE 180 MG/1
180 CAPSULE, EXTENDED RELEASE ORAL DAILY
Qty: 90 CAPSULE | Refills: 3 | Status: SHIPPED | OUTPATIENT
Start: 2023-11-13

## 2023-11-13 RX ORDER — ASPIRIN 81 MG/1
81 TABLET ORAL DAILY
Qty: 90 TABLET | Refills: 3 | Status: SHIPPED | OUTPATIENT
Start: 2023-11-13

## 2023-11-13 RX ORDER — DILTIAZEM HYDROCHLORIDE 180 MG/1
180 CAPSULE, EXTENDED RELEASE ORAL
COMMUNITY
Start: 2023-11-06 | End: 2023-11-13 | Stop reason: SDUPTHER

## 2023-11-13 NOTE — ASSESSMENT & PLAN NOTE
Smoking 1 pack per day.  Dangers of cigarette smoking were reviewed with patient in detail. Patient was Counseled for 3-10 minutes. Nicotine replacement options were discussed. Nicotine replacement was discussed-  referral to smoking cessation ordered.

## 2023-11-13 NOTE — ASSESSMENT & PLAN NOTE
BMI 33.8  Educated on increased risk of disease s/t obesity.  Educated on health benefits of at least 5 days/ week of 30 minutes moderate intensity exercise (brisk walking) and 2 or more days/ week of muscle strength activities (as tolerated).  Eat well balanced diet of fresh fruits/ vegetables, whole grains, lean meats and limit high carbohydrate foods.

## 2023-11-13 NOTE — ASSESSMENT & PLAN NOTE
Asymptomatic at this time.  Patient has history of chest pain and completed testing with his cardiologist.  No records to review at this time.  Patient to follow up with his cardiologist.

## 2023-11-13 NOTE — ASSESSMENT & PLAN NOTE
A1c 5.7%.  Discussed in detail ADA diet such as portion control, adding protein to meals, limiting carbs such as rice, pasta, bread, soft drinks, sweets, fried foods.  Patient admits to drinking 3-4 cokes a day when he does but not daily.  He is using honey in coffee, oatmeal and cream of wheat.  Mother is a diabetic.  Discussed with patient and wife changes in his diet that he can incorporate such as adding protein, decreasing portions of carbs and switching to complex carbs such as whole grains, brown rice, wheat pasta, sweet potatoes and eliminating soft drinks.  Discussed importance of implementing consistent diet daily and adding exercise.

## 2023-11-13 NOTE — ASSESSMENT & PLAN NOTE
He reports he completed hearing evaluation with hearing loss to left ear and is awaiting hearing aid from external provider.

## 2023-11-13 NOTE — ASSESSMENT & PLAN NOTE
/80  Follow low sodium diet, < 2 gm/day (avoid high salty foods such as processed meats/ sausage/galarza/ sandwich meat, chips, pickles, cheese, crackers and soft drinks/ electrolyte replacement drinks).  Avoid tobacco/ alcohol use  Educated on health benefits of at least 5 days/ week of 30 minutes moderate intensity exercise (brisk walking) and 2 or more days/ week of muscle strength activities  Daily ASA 81 mg for CV prevention  Continue current medication regimen

## 2023-11-13 NOTE — PROGRESS NOTES
Galina L Lorene, NP   [facility]  [address] [address]      PATIENT NAME: Domingo Matias  : 1980  DATE: 23  MRN: [id number]        Reason for Visit / Chief Complaint: Results       History of Present Illness / Problem Focused Workflow     Domingo Matias presents to the clinic with Results     42 yo AAM for wellness exam.  He is accompanied by his wife today.  PMH includes HTN, IGT, CP, diverticulosis, anxiety, obesity, tobacco abuse. Overall feeling well. Completed labs and wants to discuss. Still smoking. Wants referral for cessation. Denies fever, chills, night sweats, HA, sore throat, blurred vision, dizziness, cough, CP, SOB, palpitations, abdominal pain, constipation, n/v/d, hematochezia, dysuria, or hematuria. Declined flu vaccine.    Labs reviewed with A1c stable 5.7%. He states he has changed diet. Using honey for sweetener rather than sugar/ artificial sweetener. Wife expresses concern regarding gasping for breath and loud snoring. Pt admits to daytime drowsiness, does not feel rested upon awakening and has frequent awakenings in the night. Denies prior sleep study evaluation. Awaiting hearing aids after completing audiology exam for hearing loss/ tinnitus to left ear (external provider). No other concerns at this time.    Other providers  ENT/ Audiology- external provider   Cardiologist- [facility]?   [facility]  Dr. Boone         Review of Systems     Review of Systems   Constitutional:  Negative for appetite change, chills, fatigue, fever and unexpected weight change.   HENT:  Negative for congestion, ear pain, hearing loss, sinus pressure, sore throat and tinnitus.    Respiratory:  Negative for cough, chest tightness, shortness of breath and wheezing.    Cardiovascular:  Negative for chest pain, palpitations and leg swelling.   Gastrointestinal:  Negative for abdominal distention, abdominal pain, blood in stool,  constipation, diarrhea, nausea and vomiting.   Genitourinary:  Negative for dysuria and hematuria.   Musculoskeletal:  Negative for arthralgias and myalgias.   Skin:  Negative for pallor.   Allergic/Immunologic: Negative for environmental allergies.   Neurological:  Negative for dizziness, syncope, weakness, numbness and headaches.   Hematological:  Negative for adenopathy. Does not bruise/bleed easily.   Psychiatric/Behavioral:  Positive for sleep disturbance. Negative for agitation, behavioral problems, confusion, hallucinations and suicidal ideas. The patient is not nervous/anxious.        Medical / Social / Family History     ----------------------------  Diabetes mellitus  Hypertension     Past Surgical History:   Procedure Laterality Date    APPENDECTOMY      COLONOSCOPY, WITH POLYPECTOMY USING SNARE N/A 9/13/2023    Procedure: COLONOSCOPY, WITH POLYPECTOMY USING SNARE;  Surgeon: Sunshine Wen MD;  Location: [facility] ENDOSCOPY;  Service: Gastroenterology;  Laterality: N/A;       Social History     Socioeconomic History    Marital status:    Occupational History    Occupation: unemployed   Tobacco Use    Smoking status: Every Day     Current packs/day: 1.00     Average packs/day: 1 pack/day for 29.9 years (29.9 ttl pk-yrs)     Types: Cigarettes     Start date: 1994    Smokeless tobacco: Former     Types: Chew   Substance and Sexual Activity    Alcohol use: Yes     Comment: occasionally    Drug use: Not Currently     Types: Marijuana    Sexual activity: Yes     Partners: Female     Social Determinants of Health     Physical Activity: Inactive (11/3/2022)    Exercise Vital Sign     Days of Exercise per Week: 0 days     Minutes of Exercise per Session: 0 min        Family History   Problem Relation Age of Onset    Heart failure Mother     Kidney failure Mother     Diabetes Mother         Medications and Allergies     Medications  Current Outpatient Medications   Medication Instructions    aspirin  (ECOTRIN) 81 mg, Oral, Daily    diltiaZEM (CARDIZEM CD) 180 mg, Oral, Nightly    diltiaZEM (DILACOR XR) 180 mg, Oral, Daily    HYDROcodone-acetaminophen (NORCO)  mg per tablet 1 tablet, Oral, Every 6 hours PRN       Allergies  Review of patient's allergies indicates:   Allergen Reactions    Iodine Nausea And Vomiting       Physical Examination     Visit Vitals  /80 (BP Location: Left arm, Patient Position: Sitting, BP Method: Large (Automatic))   Pulse 76   Temp 98.3 °F (36.8 °C) (Oral)   Resp 20   Ht 6' (1.829 m)   Wt 113 kg (249 lb 3.2 oz)   BMI 33.80 kg/m²       Physical Exam  Vitals and nursing note reviewed.   Constitutional:       General: He is not in acute distress.     Appearance: Normal appearance. He is not ill-appearing, toxic-appearing or diaphoretic.   HENT:      Head: Normocephalic.      Right Ear: Tympanic membrane, ear canal and external ear normal.      Left Ear: Tympanic membrane, ear canal and external ear normal.      Nose: Nose normal.      Mouth/Throat:      Mouth: Mucous membranes are moist.   Eyes:      Extraocular Movements: Extraocular movements intact.      Conjunctiva/sclera: Conjunctivae normal.      Pupils: Pupils are equal, round, and reactive to light.   Neck:      Thyroid: No thyroid mass, thyromegaly or thyroid tenderness.      Vascular: No carotid bruit.   Cardiovascular:      Rate and Rhythm: Normal rate and regular rhythm.      Pulses: Normal pulses.   Pulmonary:      Effort: Pulmonary effort is normal. No respiratory distress.      Breath sounds: Normal breath sounds.   Abdominal:      General: Bowel sounds are normal. There is no distension.      Palpations: Abdomen is soft. There is no mass.      Tenderness: There is no abdominal tenderness. There is no guarding or rebound.      Hernia: No hernia is present.   Musculoskeletal:         General: Normal range of motion.      Cervical back: Neck supple.      Right lower leg: No edema.      Left lower leg: No edema.    Lymphadenopathy:      Cervical: No cervical adenopathy.   Skin:     General: Skin is warm and dry.      Capillary Refill: Capillary refill takes less than 2 seconds.   Neurological:      Mental Status: He is alert and oriented to person, place, and time. Mental status is at baseline.      Motor: No weakness.      Coordination: Coordination normal.      Gait: Gait normal.   Psychiatric:         Mood and Affect: Mood normal.         Behavior: Behavior normal.         Thought Content: Thought content normal.         Judgment: Judgment normal.           Results     Lab Results   Component Value Date    WBC 5.39 11/13/2023    RBC 5.35 11/13/2023    HGB 15.1 11/13/2023    HCT 46.8 11/13/2023    MCV 87.5 11/13/2023    MCH 28.2 11/13/2023    MCHC 32.3 (L) 11/13/2023    RDW 13.3 11/13/2023     11/13/2023    MPV 10.6 (H) 11/13/2023     Sodium Level   Date Value Ref Range Status   11/13/2023 140 136 - 145 mmol/L Final     Potassium Level   Date Value Ref Range Status   11/13/2023 4.4 3.5 - 5.1 mmol/L Final     Carbon Dioxide   Date Value Ref Range Status   11/13/2023 26 22 - 29 mmol/L Final     Blood Urea Nitrogen   Date Value Ref Range Status   11/13/2023 12.2 8.9 - 20.6 mg/dL Final     Creatinine   Date Value Ref Range Status   11/13/2023 1.19 (H) 0.73 - 1.18 mg/dL Final     Calcium Level Total   Date Value Ref Range Status   11/13/2023 9.3 8.4 - 10.2 mg/dL Final     Albumin Level   Date Value Ref Range Status   11/13/2023 4.0 3.5 - 5.0 g/dL Final     Bilirubin Total   Date Value Ref Range Status   11/13/2023 0.6 <=1.5 mg/dL Final     Alkaline Phosphatase   Date Value Ref Range Status   11/13/2023 58 40 - 150 unit/L Final     Aspartate Aminotransferase   Date Value Ref Range Status   11/13/2023 27 5 - 34 unit/L Final     Alanine Aminotransferase   Date Value Ref Range Status   11/13/2023 40 0 - 55 unit/L Final     Lab Results   Component Value Date    CHOL 182 11/13/2023     Lab Results   Component Value Date    HDL  "67 (H) 11/13/2023     No results found for: "LDLCALC"  Lab Results   Component Value Date    TRIG 65 11/13/2023     No results found for: "CHOLHDL"  Lab Results   Component Value Date    TSH 0.655 11/13/2023     Lab Results   Component Value Date    PHUR 5.5 07/20/2023    SPECGRAV >=1.030 07/20/2023    PROTEINUA Trace (A) 05/06/2023    GLUCUA Negative 02/27/2022    KETONESU neg 07/20/2023    OCCULTUA Negative 02/27/2022    NITRITE neg 07/20/2023    LEUKOCYTESUR Negative 05/06/2023     Lab Results   Component Value Date    HGBA1C 5.7 11/13/2023    HGBA1C 5.7 11/08/2022     No results found for: "MICROALBUR", "[id number]"   No results found for this or any previous visit.         Assessment       ICD-10-CM ICD-9-CM   1. Wellness examination  Z00.00 V70.0   2. IGT (impaired glucose tolerance)  R73.02 790.22   3. BMI 33.0-33.9,adult  Z68.33 V85.33   4. Excessive daytime sleepiness  G47.19 780.54   5. Snoring  R06.83 786.09   6. Gasping for breath  R06.89 786.09   7. Tinnitus of both ears  H93.13 388.30   8. Primary hypertension  I10 401.9   9. Other chest pain  R07.89 786.59   10. Cigarette nicotine dependence with nicotine-induced disorder  F17.219 292.9       Plan       Problem List Items Addressed This Visit          ENT    Tinnitus of both ears    Current Assessment & Plan     He reports he completed hearing evaluation with hearing loss to left ear and is awaiting hearing aid from external provider.            Pulmonary    Gasping for breath    Current Assessment & Plan         11/13/2023    11:08 AM   EPWORTH SLEEPINESS SCALE TOTAL SCORE    Total score 16   Patient with gasping for breath, snoring, daytime sleepiness and frequent nocturnal awakenings with elevated BMI and HTN. Referral for sleep study for eval         Relevant Orders    Ambulatory referral/consult to Sleep Disorders       Cardiac/Vascular    Hypertension    Current Assessment & Plan     /80  Follow low sodium diet, < 2 gm/day (avoid high salty " foods such as processed meats/ sausage/galarza/ sandwich meat, chips, pickles, cheese, crackers and soft drinks/ electrolyte replacement drinks).  Avoid tobacco/ alcohol use  Educated on health benefits of at least 5 days/ week of 30 minutes moderate intensity exercise (brisk walking) and 2 or more days/ week of muscle strength activities  Daily ASA 81 mg for CV prevention  Continue current medication regimen           Other chest pain    Current Assessment & Plan     Asymptomatic at this time.  Patient has history of chest pain and completed testing with his cardiologist.  No records to review at this time.  Patient to follow up with his cardiologist.            Endocrine    BMI 33.0-33.9,adult    Current Assessment & Plan     BMI 33.8  Educated on increased risk of disease s/t obesity.  Educated on health benefits of at least 5 days/ week of 30 minutes moderate intensity exercise (brisk walking) and 2 or more days/ week of muscle strength activities (as tolerated).  Eat well balanced diet of fresh fruits/ vegetables, whole grains, lean meats and limit high carbohydrate foods.            IGT (impaired glucose tolerance)    Current Assessment & Plan     A1c 5.7%.  Discussed in detail ADA diet such as portion control, adding protein to meals, limiting carbs such as rice, pasta, bread, soft drinks, sweets, fried foods.  Patient admits to drinking 3-4 cokes a day when he does but not daily.  He is using honey in coffee, oatmeal and cream of wheat.  Mother is a diabetic.  Discussed with patient and wife changes in his diet that he can incorporate such as adding protein, decreasing portions of carbs and switching to complex carbs such as whole grains, brown rice, wheat pasta, sweet potatoes and eliminating soft drinks.  Discussed importance of implementing consistent diet daily and adding exercise.            Other    Cigarette nicotine dependence with nicotine-induced disorder    Current Assessment & Plan     Smoking 1  pack per day.  Dangers of cigarette smoking were reviewed with patient in detail. Patient was Counseled for 3-10 minutes. Nicotine replacement options were discussed. Nicotine replacement was discussed-  referral to smoking cessation ordered.         Relevant Orders    Ambulatory referral/consult to Smoking Cessation Program    Excessive daytime sleepiness  See gasping for breath    Relevant Orders    Ambulatory referral/consult to Sleep Disorders    Snoring  See gasping for breath    Relevant Orders    Ambulatory referral/consult to Sleep Disorders     Other Visit Diagnoses       Wellness examination    -  Primary  Avoid tobacco/ alcohol/ drugs  Regular exercise as tolerated at least 5 days/ week of 30 minutes moderate intensity exercise (brisk walking) and 2 or more days/ week of muscle strength activities (as tolerated).  Eat well balanced diet of fresh fruits/ vegetables, whole grains, lean meats and limit high carbohydrate foods.   Healthy lifestyle habits.  Regular eye/ dental exams as recommended    Screenings:   PSA 0.79  CRC 9/13/23    Vaccines as recommended declined              Future Appointments   Date Time Provider Department Center   12/14/2023  1:00 PM Jesenia Bonilla, PAT[facility]   2/14/2024  8:30 AM [facility]   7/18/2024  9:30 AM [facility]ow Koroma,  [facility]        Follow up in about 3 months (around 2/13/2024).    Signature:  Galina Paulson NP  [facility]  [address] [address]    Date of encounter: 11/13/23

## 2023-11-16 NOTE — ASSESSMENT & PLAN NOTE
11/13/2023    11:08 AM   EPWORTH SLEEPINESS SCALE TOTAL SCORE    Total score 16     Patient with gasping for breath, snoring, daytime sleepiness and frequent nocturnal awakenings with elevated BMI and HTN. Referral for sleep study for eval

## 2023-11-17 ENCOUNTER — PATIENT MESSAGE (OUTPATIENT)
Dept: ADMINISTRATIVE | Facility: OTHER | Age: 43
End: 2023-11-17
Payer: MEDICAID

## 2023-12-05 ENCOUNTER — PROCEDURE VISIT (OUTPATIENT)
Dept: SLEEP MEDICINE | Facility: HOSPITAL | Age: 43
End: 2023-12-05
Attending: NURSE PRACTITIONER
Payer: MEDICAID

## 2023-12-05 DIAGNOSIS — G47.33 OSA (OBSTRUCTIVE SLEEP APNEA): ICD-10-CM

## 2023-12-05 DIAGNOSIS — G47.33 OSA (OBSTRUCTIVE SLEEP APNEA): Primary | ICD-10-CM

## 2023-12-05 PROCEDURE — 95811 POLYSOM 6/>YRS CPAP 4/> PARM: CPT

## 2023-12-07 LAB
LEFT EYE DM RETINOPATHY: NEGATIVE
RIGHT EYE DM RETINOPATHY: NEGATIVE

## 2023-12-11 ENCOUNTER — CLINICAL SUPPORT (OUTPATIENT)
Dept: SMOKING CESSATION | Facility: CLINIC | Age: 43
End: 2023-12-11

## 2023-12-11 DIAGNOSIS — F17.200 NICOTINE DEPENDENCE, UNCOMPLICATED: Primary | ICD-10-CM

## 2023-12-11 PROCEDURE — 99407 BEHAV CHNG SMOKING > 10 MIN: CPT | Mod: S$GLB,,, | Performed by: GENERAL PRACTICE

## 2023-12-11 PROCEDURE — 99999 PR PBB SHADOW E&M-EST. PATIENT-LVL I: ICD-10-PCS | Mod: PBBFAC,,,

## 2023-12-11 PROCEDURE — 99407 PR TOBACCO USE CESSATION INTENSIVE >10 MINUTES: ICD-10-PCS | Mod: S$GLB,,, | Performed by: GENERAL PRACTICE

## 2023-12-11 PROCEDURE — 99999 PR PBB SHADOW E&M-EST. PATIENT-LVL I: CPT | Mod: PBBFAC,,,

## 2023-12-11 NOTE — PROGRESS NOTES
Spoke with patient today in regard to smoking cessation progress for 12 month follow up. He states he is not tobacco free. Patient was already scheduled for an appointment to return to the program for Quit attempt # 2. Informed patient of benefit period, future follow ups and contact information if any further help is needed. Will resolve smart form for 12 month follow up for Quit # 1.

## 2023-12-13 ENCOUNTER — DOCUMENTATION ONLY (OUTPATIENT)
Dept: INTERNAL MEDICINE | Facility: CLINIC | Age: 43
End: 2023-12-13
Payer: MEDICAID

## 2023-12-14 ENCOUNTER — CLINICAL SUPPORT (OUTPATIENT)
Dept: SMOKING CESSATION | Facility: CLINIC | Age: 43
End: 2023-12-14

## 2023-12-14 DIAGNOSIS — F17.200 NICOTINE DEPENDENCE: Primary | ICD-10-CM

## 2023-12-14 PROCEDURE — 99404 PREV MED CNSL INDIV APPRX 60: CPT | Mod: ,,,

## 2023-12-14 PROCEDURE — 99404 PR PREVENT COUNSEL,INDIV,60 MIN: ICD-10-PCS | Mod: ,,,

## 2023-12-14 RX ORDER — MICONAZOLE NITRATE 2 %
2 CREAM (GRAM) TOPICAL
Qty: 50 EACH | Refills: 0 | Status: SHIPPED | OUTPATIENT
Start: 2023-12-14 | End: 2024-01-18 | Stop reason: SDUPTHER

## 2023-12-14 RX ORDER — IBUPROFEN 200 MG
1 TABLET ORAL DAILY
Qty: 28 PATCH | Refills: 0 | Status: SHIPPED | OUTPATIENT
Start: 2023-12-14

## 2024-01-18 ENCOUNTER — CLINICAL SUPPORT (OUTPATIENT)
Dept: SMOKING CESSATION | Facility: CLINIC | Age: 44
End: 2024-01-18

## 2024-01-18 DIAGNOSIS — F17.200 NICOTINE DEPENDENCE: Primary | ICD-10-CM

## 2024-01-18 RX ORDER — MICONAZOLE NITRATE 2 %
2 CREAM (GRAM) TOPICAL
Qty: 100 EACH | Refills: 0 | Status: SHIPPED | OUTPATIENT
Start: 2024-01-18

## 2024-01-18 RX ORDER — BUPROPION HYDROCHLORIDE 150 MG/1
TABLET, EXTENDED RELEASE ORAL
Qty: 60 TABLET | Refills: 0 | Status: SHIPPED | OUTPATIENT
Start: 2024-01-18

## 2024-01-18 NOTE — PROGRESS NOTES
Individual Follow-Up Form    1/18/2024    Quit Date:     Clinical Status of Patient: Outpatient    Continuing Medication: yes  Patches or Nicotine gum    Other Medications: Wellbutrin     Target Symptoms: Withdrawal and medication side effects. The following were  rated moderate (3) to severe (4) on TCRS:  Moderate (3): nicotine patches - increased HR  Severe (4): none    Comments: Patient presents for follow up smoking 20 cigarettes per day. Pt remains on tobacco cessation medication of 21 mg nicotine patch QD and 2 mg nicotine gum PRN (1-2 per hour in place of cigarettes.) Pt stated that he discontinued using the nicotine patches due to increased heart rate and feeling bad while using the patch.  Pt stated that he never received the nicotine gum from the pharmacy.  Discussed other smoking cessation medications.  Pt stated that he would like to try Wellbutrin.  Discussed Wellbutrin, how to take it and side effects.  Will order 150 mg Wellbutrin starter dose and 2 mg nicotine gum from [facility] mail order pharmacy.  Pt stated that he recently found out some disturbing information and it has him feeling really stressed and anxious.  Discussed alternative ways to deal with stress rather than smoking a cigarette.  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  Discussed things that he can do for distraction or finding a new hobby.  Reviewed learned addiction model, personal reasons for quitting, medications, goals, quit date.  Encouraged pt to move his cigarettes and work on rate reduction.  Pt will decrease by 2 cigarettes each week.     Diagnosis: F17.200    Next Visit: 2 weeks

## 2024-02-21 ENCOUNTER — CLINICAL SUPPORT (OUTPATIENT)
Dept: SMOKING CESSATION | Facility: CLINIC | Age: 44
End: 2024-02-21

## 2024-02-21 DIAGNOSIS — F17.200 NICOTINE DEPENDENCE: Primary | ICD-10-CM

## 2024-02-21 NOTE — PROGRESS NOTES
Individual Follow-Up Form    2/21/2024    Quit Date:     Clinical Status of Patient: Outpatient    Continuing Medication: yes  Wellbutrin or Nicotine gum     Target Symptoms: Withdrawal and medication side effects. The following were  rated moderate (3) to severe (4) on TCRS:  Moderate (3): none  Severe (4): none    Comments: Patient presents for follow up smoking 18-20 cigarettes per day. Pt remains on tobacco cessation regimen of  2 mg nicotine gum PRN (1-2 per hour in place of cigarettes.) No adverse effects noted at this time. Pt stated that he still has not received his Wellbutrin.  Called and spoke with [facility] in [place].  Pharmacist stated that it will be mailed out today.  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  Pt stated that he took 1 carton of cigarettes with him but he did have to call his wife to send him 2 more packs. Encouraged pt to avoid the smoking area.  Instead of going during each break, try smoking every other break. Encouraged pt to move his cigarettes when he is home and work on making the inside of his vehicle tobacco free.  Pt stated that someone got hurt while he was offshore and he started worrying about his own safety.  Pt stated this caused him to stress and want to smoke.  Discussed focusing on something positive or redirecting his attention when he is feeling anxious or stressed.  Discussed deep breathing exercises, meditation or exercising to help reduce his stress and anxiety.  Reviewed strategies, cues, and triggers. Introduced the negative impact of tobacco on health, the health advantages of discontinuing the use of tobacco, time line improved health changes after a quit, withdrawal issues to expect from nicotine and habit, and ways to achieve the goal of a quit.  Discussed choosing a quit day.  Pt stated that he would like to be quit in 2-3 months.  Pt stated that he does not want to rush.  Reviewed rate reduction.  Pt will work on  decreasing by 2 cigarettes each week. Will follow up with pt in a few weeks.          Diagnosis: F17.200    Next Visit: 2 weeks

## 2024-03-07 ENCOUNTER — TELEPHONE (OUTPATIENT)
Dept: SMOKING CESSATION | Facility: CLINIC | Age: 44
End: 2024-03-07
Payer: MEDICAID

## 2024-03-21 ENCOUNTER — TELEPHONE (OUTPATIENT)
Dept: SMOKING CESSATION | Facility: CLINIC | Age: 44
End: 2024-03-21
Payer: MEDICAID

## 2024-03-21 NOTE — TELEPHONE ENCOUNTER
Pt had not shown up for his follow up appointment.  Called pt.  No answer.  Left voice message with contact information.

## 2024-03-27 ENCOUNTER — TELEPHONE (OUTPATIENT)
Dept: SMOKING CESSATION | Facility: CLINIC | Age: 44
End: 2024-03-27
Payer: MEDICAID

## 2024-03-27 ENCOUNTER — CLINICAL SUPPORT (OUTPATIENT)
Dept: SMOKING CESSATION | Facility: CLINIC | Age: 44
End: 2024-03-27

## 2024-03-27 DIAGNOSIS — F17.200 NICOTINE DEPENDENCE: Primary | ICD-10-CM

## 2024-03-27 PROCEDURE — 99999 PR PBB SHADOW E&M-EST. PATIENT-LVL I: CPT | Mod: PBBFAC,,,

## 2024-03-27 PROCEDURE — 99407 BEHAV CHNG SMOKING > 10 MIN: CPT | Mod: S$GLB,,,

## 2024-03-27 NOTE — PROGRESS NOTES
Called pt to f/u on his 3 month smoking cessation quit status. Pt stated he is still smoking, had cut back a lot but then he was dealing with some personal issues and increased his smoking. Pt is still in program and is suing patches and Chantix to aid in his quit. Informed him of benefit period, phone follow ups, and contact information. Will complete smart form and will continue to follow up on quit #2 episode.

## 2024-04-02 ENCOUNTER — TELEPHONE (OUTPATIENT)
Dept: SMOKING CESSATION | Facility: CLINIC | Age: 44
End: 2024-04-02
Payer: MEDICAID

## 2024-04-02 NOTE — TELEPHONE ENCOUNTER
Left voice message with contact information regarding rescheduling his missed follow up appointment.

## 2024-05-15 ENCOUNTER — TELEPHONE (OUTPATIENT)
Dept: UROLOGY | Facility: CLINIC | Age: 44
End: 2024-05-15
Payer: MEDICAID

## 2024-05-15 DIAGNOSIS — Z12.5 PROSTATE CANCER SCREENING: Primary | ICD-10-CM

## 2024-07-01 ENCOUNTER — TELEPHONE (OUTPATIENT)
Dept: SMOKING CESSATION | Facility: CLINIC | Age: 44
End: 2024-07-01
Payer: MEDICAID

## 2024-08-02 ENCOUNTER — TELEPHONE (OUTPATIENT)
Dept: INTERNAL MEDICINE | Facility: CLINIC | Age: 44
End: 2024-08-02
Payer: MEDICAID

## 2024-08-02 ENCOUNTER — TELEPHONE (OUTPATIENT)
Dept: INTERNAL MEDICINE | Facility: CLINIC | Age: 44
End: 2024-08-02

## 2024-08-05 ENCOUNTER — HOSPITAL ENCOUNTER (EMERGENCY)
Facility: HOSPITAL | Age: 44
Discharge: HOME | End: 2024-08-05
Attending: FAMILY MEDICINE

## 2024-08-05 VITALS
HEIGHT: 72 IN | HEART RATE: 82 BPM | BODY MASS INDEX: 35.88 KG/M2 | OXYGEN SATURATION: 97 % | WEIGHT: 264.88 LBS | DIASTOLIC BLOOD PRESSURE: 86 MMHG | SYSTOLIC BLOOD PRESSURE: 145 MMHG | TEMPERATURE: 98 F | RESPIRATION RATE: 18 BRPM
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
## 2024-08-05 DIAGNOSIS — S60.561A INSECT BITE HAND, RIGHT, INITIAL ENCOUNTER: Primary | ICD-10-CM

## 2024-08-05 PROCEDURE — 99283 EMERGENCY DEPT VISIT LOW MDM: CPT

## 2024-08-05 RX ORDER — CEPHALEXIN 500 MG/1
500 CAPSULE ORAL EVERY 6 HOURS
Qty: 20 CAPSULE | Refills: 0 | Status: SHIPPED | OUTPATIENT
Start: 2024-08-05 | End: 2024-08-10

## 2024-12-18 ENCOUNTER — CLINICAL SUPPORT (OUTPATIENT)
Dept: SMOKING CESSATION | Facility: CLINIC | Age: 44
End: 2024-12-18

## 2024-12-18 ENCOUNTER — TELEPHONE (OUTPATIENT)
Dept: SMOKING CESSATION | Facility: CLINIC | Age: 44
End: 2024-12-18

## 2024-12-18 DIAGNOSIS — F17.200 NICOTINE DEPENDENCE: Primary | ICD-10-CM

## 2024-12-18 PROCEDURE — 99999 PR PBB SHADOW E&M-EST. PATIENT-LVL I: CPT | Mod: PBBFAC,,,

## 2024-12-18 PROCEDURE — 99407 BEHAV CHNG SMOKING > 10 MIN: CPT | Mod: ,,, | Performed by: GENERAL PRACTICE

## 2024-12-18 NOTE — PROGRESS NOTES
Spoke with patient today in regard to smoking cessation progress 6/12 month telephone follow up, he states that he is not tobacco free.  Patient states he trimmed down to 10 cigarettes daily.  Commended patient on the accomplishments thus far.  Informed patient of benefit period, future follow up, and contact information if any further help or support is needed. Patient not ready to schedule appointment at this time.  Will complete smart form for 6/12 month follow up on Quit attempt #2 and resolve episode.

## 2025-08-27 DIAGNOSIS — G47.30 SLEEP APNEA: Primary | ICD-10-CM

## 2025-08-27 DIAGNOSIS — Z99.89 HISTORY OF CONTINUOUS POSITIVE AIRWAY PRESSURE (CPAP) THERAPY AT HOME: ICD-10-CM

## (undated) DEVICE — MANIFOLD 4 PORT

## (undated) DEVICE — SOL IRRI STRL WATER 1000ML

## (undated) DEVICE — TRAP ETRAP POLYP 50 TRAY

## (undated) DEVICE — SNARE EXACTO COLD

## (undated) DEVICE — KIT SURGICAL COLON .25 1.1OZ